# Patient Record
Sex: FEMALE | Race: WHITE | NOT HISPANIC OR LATINO | URBAN - METROPOLITAN AREA
[De-identification: names, ages, dates, MRNs, and addresses within clinical notes are randomized per-mention and may not be internally consistent; named-entity substitution may affect disease eponyms.]

---

## 2023-02-28 LAB
ERYTHROCYTE DISTRIBUTION WIDTH (RATIO) BY AUTOMATED COUNT: 13.5 % (ref 11.5–14.5)
ERYTHROCYTE MEAN CORPUSCULAR HEMOGLOBIN CONCENTRATION (G/DL) BY AUTOMATED: 32.2 G/DL (ref 32–36)
ERYTHROCYTE MEAN CORPUSCULAR VOLUME (FL) BY AUTOMATED COUNT: 83 FL (ref 80–100)
ERYTHROCYTES (10*6/UL) IN BLOOD BY AUTOMATED COUNT: 4.19 X10E12/L (ref 4–5.2)
GLUCOSE, 1 HR SCREEN, PREG: 120 MG/DL
HEMATOCRIT (%) IN BLOOD BY AUTOMATED COUNT: 34.8 % (ref 36–46)
HEMOGLOBIN (G/DL) IN BLOOD: 11.2 G/DL (ref 12–16)
LEUKOCYTES (10*3/UL) IN BLOOD BY AUTOMATED COUNT: 11.3 X10E9/L (ref 4.4–11.3)
NRBC (PER 100 WBCS) BY AUTOMATED COUNT: 0 /100 WBC (ref 0–0)
PLATELETS (10*3/UL) IN BLOOD AUTOMATED COUNT: 243 X10E9/L (ref 150–450)

## 2023-03-10 PROBLEM — B35.9 TINEA: Status: ACTIVE | Noted: 2023-03-10

## 2023-03-10 PROBLEM — S90.31XA CONTUSION OF RIGHT FOOT: Status: ACTIVE | Noted: 2023-03-10

## 2023-03-10 PROBLEM — S99.929A FOOT INJURY: Status: ACTIVE | Noted: 2023-03-10

## 2023-03-10 PROBLEM — R30.0 DYSURIA: Status: ACTIVE | Noted: 2023-03-10

## 2023-03-10 PROBLEM — M79.671 ACUTE FOOT PAIN, RIGHT: Status: ACTIVE | Noted: 2023-03-10

## 2023-03-10 PROBLEM — S93.609A FOOT SPRAIN: Status: ACTIVE | Noted: 2023-03-10

## 2023-03-10 PROBLEM — R35.0 URINARY FREQUENCY: Status: ACTIVE | Noted: 2023-03-10

## 2023-03-10 PROBLEM — R05.8 POST-VIRAL COUGH SYNDROME: Status: ACTIVE | Noted: 2023-03-10

## 2023-03-20 ENCOUNTER — DOCUMENTATION (OUTPATIENT)
Dept: PEDIATRICS | Facility: CLINIC | Age: 28
End: 2023-03-20

## 2023-03-20 RX ORDER — B-COMPLEX WITH VITAMIN C
1 TABLET ORAL DAILY
COMMUNITY
Start: 2023-02-28

## 2023-03-20 RX ORDER — NORETHINDRONE ACETATE AND ETHINYL ESTRADIOL 1MG-20(24)
1 KIT ORAL DAILY
COMMUNITY
Start: 2022-05-12 | End: 2024-05-02

## 2023-03-20 RX ORDER — KETOCONAZOLE 20 MG/ML
1 SHAMPOO, SUSPENSION TOPICAL 2 TIMES WEEKLY
COMMUNITY
Start: 2022-05-24 | End: 2024-04-12

## 2023-04-29 LAB — GROUP B STREP SCREEN: NORMAL

## 2023-06-16 LAB
CLUE CELLS: NORMAL
NUGENT SCORE: NORMAL
YEAST: NORMAL

## 2024-01-23 ENCOUNTER — TELEPHONE (OUTPATIENT)
Dept: OBSTETRICS AND GYNECOLOGY | Facility: CLINIC | Age: 29
End: 2024-01-23
Payer: COMMERCIAL

## 2024-01-23 DIAGNOSIS — R21 RASH: Primary | ICD-10-CM

## 2024-01-25 NOTE — TELEPHONE ENCOUNTER
Pt reports she is still having problems with PUPPS even though delivery was 8 moths ago.  She felt like things started to resolve after delivery but then she got raised red area on hips and b/l elbows and now one of the areas is still persistently raised, red and itchy.  According to PN records, itching started in stretch marks and then moved to elbow/hips.  Was treated as PUPPS but made patient aware PUPPS usually doesn't last this long and that may  not have been correct diagnosis although treatment would not have been different.   Recommend follow up with Dermatology. Referral placed.

## 2024-01-29 ENCOUNTER — DOCUMENTATION (OUTPATIENT)
Dept: PHYSICAL THERAPY | Facility: CLINIC | Age: 29
End: 2024-01-29
Payer: COMMERCIAL

## 2024-04-12 ENCOUNTER — OFFICE VISIT (OUTPATIENT)
Dept: OBSTETRICS AND GYNECOLOGY | Facility: CLINIC | Age: 29
End: 2024-04-12
Payer: COMMERCIAL

## 2024-04-12 VITALS
BODY MASS INDEX: 26.12 KG/M2 | DIASTOLIC BLOOD PRESSURE: 60 MMHG | SYSTOLIC BLOOD PRESSURE: 118 MMHG | HEIGHT: 64 IN | WEIGHT: 153 LBS

## 2024-04-12 DIAGNOSIS — Z12.4 CERVICAL CANCER SCREENING: ICD-10-CM

## 2024-04-12 DIAGNOSIS — Z01.419 WELL WOMAN EXAM: Primary | ICD-10-CM

## 2024-04-12 PROCEDURE — 99395 PREV VISIT EST AGE 18-39: CPT

## 2024-04-12 PROCEDURE — 88175 CYTOPATH C/V AUTO FLUID REDO: CPT

## 2024-04-12 PROCEDURE — 88141 CYTOPATH C/V INTERPRET: CPT | Performed by: PATHOLOGY

## 2024-04-12 ASSESSMENT — PAIN SCALES - GENERAL: PAINLEVEL: 0-NO PAIN

## 2024-04-12 NOTE — PROGRESS NOTES
Assessment/Plan   Diagnoses and all orders for this visit:  Well woman exam  Cervical cancer screening  -     THINPREP PAP TEST (24-30)    Discussed safe pregnancy spacing.   Encouraged to reach out to our office with any questions or concerns.   Encouraged patient to follow up annually or PRN    ABRAM Freeman-SAULO     Subjective   Ana Cruz is a 28 y.o. female who is here for a routine exam.     Concerns today:  Patient is interested in pregnancy. She had a baby in may 2023, is curious about safe pregnancy spacing.     Patient's last menstrual period was 2024 (approximate).   Periods are regular every 28-30 days, lasting 5 days.   Dysmenorrhea:mild, occurring first 1-2 days of flow.   Cyclic symptoms include none.     Sexual Activity: sexually active, male partners; Patient reports 1 partners in the last 12 months.  Pain with intercourse? No   Loss of desire? No   Able to have an orgasm? Yes     History of prior STI: none  Current contraception: OCP (estrogen/progesterone)  Last pap: patient reports history of pap, none found in chart. Repeat to be completed today   History of abnormal Pap smear: no  Family history of uterine or ovarian cancer: no  Last mammogram:na   History of abnormal mammogram: no  Family history of breast cancer: no    Past Medical History:   Diagnosis Date    Other conditions influencing health status     Allergy To Bugs      Past Surgical History:   Procedure Laterality Date    OTHER SURGICAL HISTORY  2020    No history of surgery        Review of Systems   All other systems reviewed and are negative.     Menstrual History:  OB History          1    Para   1    Term   1            AB        Living   1         SAB        IAB        Ectopic        Multiple        Live Births   1                Pregnancy history: Patient reports that her previous pregnancy was significant for PUPPS and an Episiotomy with a 3rd degree laceration.   Patient's last menstrual  "period was 03/29/2024 (approximate).     Objective   /60   Ht 1.626 m (5' 4\")   Wt 69.4 kg (153 lb)   LMP 03/29/2024 (Approximate)   BMI 26.26 kg/m²       General:   Alert and oriented x 3   Heart:  Thyroid: Regular rate, rhythm  Euthyroid, normal shape and size   Lungs:  Breast: Clear to auscultation bilaterally  Symmetrical, no skin changes/nipple discharge, redness, tenderness, no masses palpated bilaterally   Abdomen: Soft, non tender   Vulva: EGBUS normal   Vagina: Pink, normal discharge, pap completed today.    Cervix: No CMT   Uterus: Normal shape, size   Adnexa: NT bilaterally       "

## 2024-04-22 LAB
CYTOLOGY CMNT CVX/VAG CYTO-IMP: NORMAL
LAB AP HPV GENOTYPE QUESTION: NO
LAB AP HPV HR: NORMAL
LABORATORY COMMENT REPORT: NORMAL
LMP START DATE: NORMAL
PATH REPORT.TOTAL CANCER: NORMAL

## 2024-05-31 ENCOUNTER — APPOINTMENT (OUTPATIENT)
Dept: OBSTETRICS AND GYNECOLOGY | Facility: CLINIC | Age: 29
End: 2024-05-31
Payer: COMMERCIAL

## 2024-07-17 ENCOUNTER — INITIAL PRENATAL (OUTPATIENT)
Dept: OBSTETRICS AND GYNECOLOGY | Facility: CLINIC | Age: 29
End: 2024-07-17
Payer: COMMERCIAL

## 2024-07-17 ENCOUNTER — APPOINTMENT (OUTPATIENT)
Dept: OBSTETRICS AND GYNECOLOGY | Facility: CLINIC | Age: 29
End: 2024-07-17
Payer: COMMERCIAL

## 2024-07-17 VITALS — BODY MASS INDEX: 27.4 KG/M2 | SYSTOLIC BLOOD PRESSURE: 110 MMHG | WEIGHT: 159.6 LBS | DIASTOLIC BLOOD PRESSURE: 60 MMHG

## 2024-07-17 DIAGNOSIS — Z32.01 PREGNANCY TEST POSITIVE (HHS-HCC): ICD-10-CM

## 2024-07-17 DIAGNOSIS — Z34.81 ENCOUNTER FOR SUPERVISION OF NORMAL PREGNANCY IN MULTIGRAVIDA IN FIRST TRIMESTER (HHS-HCC): Primary | ICD-10-CM

## 2024-07-17 DIAGNOSIS — O09.299 HX OF MATERNAL LACERATION, 3RD DEGREE, CURRENTLY PREGNANT (HHS-HCC): ICD-10-CM

## 2024-07-17 LAB — PREGNANCY TEST URINE, POC: POSITIVE

## 2024-07-17 PROCEDURE — 0500F INITIAL PRENATAL CARE VISIT: CPT

## 2024-07-17 PROCEDURE — 87086 URINE CULTURE/COLONY COUNT: CPT

## 2024-07-17 PROCEDURE — 81025 URINE PREGNANCY TEST: CPT

## 2024-07-17 PROCEDURE — 87491 CHLMYD TRACH DNA AMP PROBE: CPT

## 2024-07-17 PROCEDURE — 87591 N.GONORRHOEAE DNA AMP PROB: CPT

## 2024-07-17 ASSESSMENT — ENCOUNTER SYMPTOMS
HEMATOLOGIC/LYMPHATIC NEGATIVE: 0
CARDIOVASCULAR NEGATIVE: 0
PSYCHIATRIC NEGATIVE: 0
ENDOCRINE NEGATIVE: 0
RESPIRATORY NEGATIVE: 0
ALLERGIC/IMMUNOLOGIC NEGATIVE: 0
NEUROLOGICAL NEGATIVE: 0
CONSTITUTIONAL NEGATIVE: 0
GASTROINTESTINAL NEGATIVE: 0
EYES NEGATIVE: 0
MUSCULOSKELETAL NEGATIVE: 0

## 2024-07-17 NOTE — PROGRESS NOTES
Assessment   Diagnoses and all orders for this visit:  Encounter for supervision of normal pregnancy [facility]  -     US MAC OB imaging order; Future  -     CBC Anemia Panel With Reflex,Pregnancy; Future  -     HEMOGLOBIN IDENTIFICATION WITH PATH REVIEW; Future  -     Hepatitis B surface antigen; Future  -     Hepatitis C antibody; Future  -     Rubella Antibody, IgG; Future  -     Syphilis Screen with Reflex; Future  -     HIV 1/2 Antigen/Antibody Screen with Reflex to Confirmation; Future  -     Varicella Zoster Antibody, IgG; Future  Hx of maternal laceration, 3rd degree, currently pregnant ([facility])  Pregnancy test positive ([facility])  -     POCT pregnancy, urine manually resulted  -     Urine culture  -     C. trachomatis + N. gonorrhoeae, Amplified; Future      Plan   - New OB resources provided and reviewed with particular attention to dietary, travel, and medication restrictions  - Oriented to practice, CNM vs. MD care  - Reviewed IOM recommendations for weight gain given pt's BMI: 15-25 pounds (BMI 25-29.9)  - Reviewed bleeding precautions, warning signs, when to call provider; phone number provided  - Routine NOB labs ordered  - Discussed Centering Pregnancy with patient, patient not interested at this time.   - Dating ultrasound ordered  - Return in 4 weeks for routine prenatal care    ARBAM Freeman-CNM    Subjective   Ana Cruz is a 29 y.o.  at 8 weeks with a working estimated date of delivery of 24. who presents for an initial prenatal visit. This pregnancy is planned.    Bleeding or cramping since LMP: no  Taking prenatal vitamin: Yes  Ultrasound completed this pregnancy: No    Last pap: 24  ROS neg    OB History    Para Term  AB Living   2 1 1     1   SAB IAB Ectopic Multiple Live Births           1      # Outcome Date GA Lbr Lito/2nd Weight Sex Type Anes PTL Lv   2 Current            1 Term      Vag-Spont   CHARISSE         Prior pregnancy complications:  third degree laceration.   History of hypertension:  No    Past Medical History:   Diagnosis Date    Other conditions influencing health status     Allergy To Bugs      Past Surgical History:   Procedure Laterality Date    OTHER SURGICAL HISTORY  2020    No history of surgery      Social History     Socioeconomic History    Marital status: Single   Tobacco Use    Smoking status: Never    Smokeless tobacco: Never   Vaping Use    Vaping status: Never Used   Substance and Sexual Activity    Alcohol use: Yes     Comment: social    Drug use: Never    Sexual activity: Yes     Partners: Male        Objective   Physical Exam  Weight: 72.4 kg (159 lb 9.6 oz)  TW.272 kg (9.6 oz)   Expected Total Weight Gain: 7 kg (15 lb)-11.5 kg (25 lb)   Pregravid BMI: 27.28  BP: 110/60         General:   Alert and oriented x 3   Heart:  Lungs: Regular rate, rhythm  Clear to auscultation bilaterally   Thyroid: Euthyroid, normal shape and size   Breast: Symmetrical, no skin changes/nipple discharge, redness, tenderness, no masses palpated bilaterally   Abdomen: Soft, non tender   Vulva: EGBUS normal   Vagina: Pink, normal discharge   Cervix: No CMT   Uterus: Gravid 8 weeks   Adnexa: NT bilaterally       Postpartum Depression: Not on file        Problem List       No episode was linked to this visit.               Important in pregnancy    Avoidance of alcohol, tobacco and drug use   Dietary restrictions reviewed including avoiding raw or poorly cooked meat, lunch meat and soft cheeses  3.    Adequate water intake.  Avoid empty calories with juices  4.    Recommendation for weight gain based on initial BMI (body mass index)  5.    Limit caffeine to less than 200-300 mg/day  6.    Take folic acid 400 mcg daily.  Incorporate 5,000u Vitamin D3 per day.  Discuss Magnesium Supplementation  7.    Importance of good sleep hygiene and avoidance of laying on back after 15 weeks  8.    Encourage daily physical  activity of 30 minutes a day the majority of the days of the week  9.    Discussed normal physiologic changes:  Round ligament pain, nausea, breast tenderness  10.  Discussed natural remedies, vitamins and prescription medications for nausea  11.  Baby aspirin 162mg daily (two baby aspirin) for the reduction of pre-eclampsia during pregnancy.  Even if you have          never had any blood pressure issues, you can develop hypertension during your pregnancy.  This has been well          Studied and safe to take starting at 12 weeks gestation until after the birth of your baby.     **IF AT ANYTIME DURING YOUR PREGNANCY YOU HAVE CONCERNS THAT YOU CANNOT AFFORD HEALTHY FOOD PLEASE LET US KNOW!**  We have a Food for Life program and would be happy to place a referral for you.  It is so important to eat healthy during your pregnancy and we treat food as medicine.  Healthy food is expensive!  This program will allow you and your family up to 4 to receive food and recipes for one week per month.  This needs to be renewed every 6 months.     Ultrasound and screening for aneuploidies (Down Syndrome/Trisomy 21, Trisomy 13 + 18)  A requisition has been placed for a dating ultrasound.  Please call to get that scheduled.    At this ultrasound they will ask you if your would like to proceed with screening for genetic disorders that are listed above.  They will do that with an ultrasound at approximately 13 weeks and we also draw blood work for screening which includes the fetal sex if you desire to know.     Ultrasound for anatomy will be done at 19 weeks.  Based on risk factors and any concerns the maternal fetal medicine provider has, you may need a repeat ultrasound.  Healthy pregnancies that do not have any other concerns by the midwife or maternal fetal medicine do not have any repeat ultrasounds done.     Labs:   An order will be placed for your new ob labs.  Please get those done at the time of your ultrasound.  They will  collect          multiple tubes of blood for new ob labs and also urine for STI testing and a urine culture.   If there are any concerns with any blood work or urine testing WE WILL CALL YOU OR COMMUNICATE VIA          [employer]!!!   The biggest concerns our patients have is when they see their complete blood count.  The reference          range in the result is for a non pregnant person!  We will notify you if there is any need to start an iron supplement.  3.    At 26-28 weeks a glucose test is ordered to see if you have gestational diabetes.  We also reassess if you have          Anemia, which can be common in pregnancy  4.    Group B strep culture will be done at 36 weeks gestation.     We also recommend that you be screened once in your life for Cystic Fibrosis and Spinal Muscular Atrophy.  This assesses if we need your partner to be tested if you are a carrier of a gene that can be passed along to your baby.  For this to happen, both parents must be a carrier to the gene.     Choices for care and hospital for birth:  I am a Certified Nurse Midwife and practice in a group setting, which means that any of the midwives in my group practice may be there for your birth.  We care for healthy females during pregnancy and labor/birth.  We practice in collaboration with physicians within our group.  If there are any concerns with your pregnancy, labor or birth our physicians work closely with us.       The midwives in our practice strongly encourage you to explore the option of Centering Pregnancy which has been studied for better birth outcomes!  Care will be done in a group setting with 1:1 time with a midwife and then in depth education about every stage of your pregnancy, labor/birth,  care, feeding choices, pediatrician options, birth control and coping techniques for the first few weeks after birth.  We have day groups and our [place] location and a Monday evening group at [facility].  The groups follow your  "normal prenatal schedule and yes, we keep in contact and I see you at the end of your pregnancy.     There are certain medical conditions that \"risks you out\" of midwifery care that we are constantly assessing for.  Some conditions during your pregnancy that would risk you out of midwifery care would be:   Severe growth restriction of your baby   Labor/Birth  less than 35 weeks   Severe pre-eclampsia at any time during your pregnancy/labor/birth   Gestational Diabetes needing medication (insulin) to control your blood sugars   If you decline or do not complete your glucose testing to rule out diet controlled diabetes by 32 weeks   If you are diagnosed with chronic hypertension during your pregnancy and need to start medication     The options for birth where we provide / Certified Nurse Midwifery coverage with a board certified OB/GYN, in house anesthesia and neonataology coverage are:     [facility] at [place], [place]     To call for questions regarding your care of if you are in labor is [phone]  My nurses name is Janel Dominguez who can answer questions and keep me updated should any questions or concerns arise during your pregnancy.     After hours, the answering service will ask you where you intended to give birth and connect you to the midwife on call at [facility] or the [facility] at [facility].     If you would like to tour either facility:  Please call the Childbirth education line at [phone]     Danger signs to report:  Seek medical care immediately if you have pain that is doubling you over or vaginal bleeding that is heavier than a  period  Notify the office should you have any burning, urgency, frequency of urination or other concerning symptoms.     Medications that are safe for common discomforts of pregnancy:   Tylenol   Tums or Papaya extract for any upset stomach or " "heartburn   Zyrtec, Claritin, Benadryl for allergy symptoms   Sudafed or Robitussin for cold symptoms  [employer] is an axel that is great for what medications are safe to take throughout your pregnancy and breastfeeding journey through the first year of life!  Well worth the $3.99     Work restrictions:  A normal healthy pregnancy without any complications are able to have the standard pregnancy work restrictions which is no pushing/pulling/lifting greater than 25 pounds independently     FMLA paperwork  Can be brought to the office for us to fill out for when you are starting your maternity leave (either your scheduled date of going to the hospital or your due date).  We cannot give out early FMLA when there is no documented medical conditions that are considered \"normal pregnancy\" events.     Comfort measures   Chiropractors are great for alleviation of ligament pain   Yoga is good for your ligaments and mentally preparing for baby and labor.  A prenatal yoga class is recommended.  3.     Prenatal massages are fine  4.     A maternity support belt is an amazing thing that can help ligament pain -- can be purchased on Amazon  5.     Good supportive shoes are key to helping with ligament pain     Dental care  It is very important to see a dentist during your pregnancy for routine cleaning and also if you develop any dental pain during your pregnancy.  Healthy gums and teeth are very important during your pregnancy.  We can provide you with a dental letter if your dentist would like one.     Thank you for choosing our practice and [facility] for you healthcare!  I am excited to partner with you during this very special time!      If there is anything I can do to help make your experience is positive, please come to your visits with questions and concerns and do not be afraid to ask what is on your mind!  We will see you in the office every 4 weeks until you are 30 weeks, then every 2 weeks until 36 weeks and " then weekly until your baby is born.

## 2024-07-18 LAB
BACTERIA UR CULT: NORMAL
C TRACH RRNA SPEC QL NAA+PROBE: NEGATIVE
N GONORRHOEA DNA SPEC QL PROBE+SIG AMP: NEGATIVE

## 2024-07-19 ENCOUNTER — APPOINTMENT (OUTPATIENT)
Dept: OBSTETRICS AND GYNECOLOGY | Facility: CLINIC | Age: 29
End: 2024-07-19
Payer: COMMERCIAL

## 2024-07-31 ENCOUNTER — TELEPHONE (OUTPATIENT)
Dept: OBSTETRICS AND GYNECOLOGY | Facility: CLINIC | Age: 29
End: 2024-07-31
Payer: COMMERCIAL

## 2024-07-31 DIAGNOSIS — Z3A.10 10 WEEKS GESTATION OF PREGNANCY (HHS-HCC): ICD-10-CM

## 2024-07-31 NOTE — TELEPHONE ENCOUNTER
Pt verified by name and .  Pt calling regarding her blood work and genetic testing.  Pt advised to get blood work done.  Pt is aware she can  [employer] genetic box to take to lab.  Nurse will have at .  Pt will have  pick it up.  Pt has no questions at this time.

## 2024-08-01 ENCOUNTER — LAB (OUTPATIENT)
Dept: LAB | Facility: LAB | Age: 29
End: 2024-08-01
Payer: COMMERCIAL

## 2024-08-01 DIAGNOSIS — Z34.81 ENCOUNTER FOR SUPERVISION OF NORMAL PREGNANCY IN MULTIGRAVIDA IN FIRST TRIMESTER (HHS-HCC): ICD-10-CM

## 2024-08-01 LAB
ERYTHROCYTE [DISTWIDTH] IN BLOOD BY AUTOMATED COUNT: 13.8 % (ref 11.5–14.5)
HBV SURFACE AG SERPL QL IA: NONREACTIVE
HCT VFR BLD AUTO: 41.2 % (ref 36–46)
HCV AB SER QL: NONREACTIVE
HGB BLD-MCNC: 13.1 G/DL (ref 12–16)
HIV 1+2 AB+HIV1 P24 AG SERPL QL IA: NONREACTIVE
MCH RBC QN AUTO: 25.7 PG (ref 26–34)
MCHC RBC AUTO-ENTMCNC: 31.8 G/DL (ref 32–36)
MCV RBC AUTO: 81 FL (ref 80–100)
NRBC BLD-RTO: 0 /100 WBCS (ref 0–0)
PLATELET # BLD AUTO: 288 X10*3/UL (ref 150–450)
RBC # BLD AUTO: 5.1 X10*6/UL (ref 4–5.2)
REFLEX ADDED, ANEMIA PANEL: NORMAL
RUBV IGG SERPL IA-ACNC: 2.4 IA
RUBV IGG SERPL QL IA: POSITIVE
TREPONEMA PALLIDUM IGG+IGM AB [PRESENCE] IN SERUM OR PLASMA BY IMMUNOASSAY: NONREACTIVE
VARICELLA ZOSTER IGG INDEX: 1.2 IA
VZV IGG SER QL IA: POSITIVE
WBC # BLD AUTO: 9.4 X10*3/UL (ref 4.4–11.3)

## 2024-08-01 PROCEDURE — 86803 HEPATITIS C AB TEST: CPT

## 2024-08-01 PROCEDURE — 86317 IMMUNOASSAY INFECTIOUS AGENT: CPT

## 2024-08-01 PROCEDURE — 87389 HIV-1 AG W/HIV-1&-2 AB AG IA: CPT

## 2024-08-01 PROCEDURE — 85027 COMPLETE CBC AUTOMATED: CPT

## 2024-08-01 PROCEDURE — 87340 HEPATITIS B SURFACE AG IA: CPT

## 2024-08-01 PROCEDURE — 86787 VARICELLA-ZOSTER ANTIBODY: CPT

## 2024-08-01 PROCEDURE — 36415 COLL VENOUS BLD VENIPUNCTURE: CPT

## 2024-08-01 PROCEDURE — 83021 HEMOGLOBIN CHROMOTOGRAPHY: CPT

## 2024-08-01 PROCEDURE — 86780 TREPONEMA PALLIDUM: CPT

## 2024-08-04 LAB
HEMOGLOBIN A2: 3.1 % (ref 2–3.5)
HEMOGLOBIN A: 96.5 % (ref 95.8–98)
HEMOGLOBIN F: 0.4 % (ref 0–2)
HEMOGLOBIN IDENTIFICATION INTERPRETATION: NORMAL
PATH REVIEW-HGB IDENTIFICATION: NORMAL

## 2024-08-13 LAB — SCAN RESULT: NORMAL

## 2024-08-15 NOTE — PROGRESS NOTES
Assessment/Plan   Diagnoses and all orders for this visit:  Encounter for supervision of normal pregnancy in multigravida in first trimester ([facility])  Hx of maternal laceration, 3rd degree, currently pregnant ([facility])      Lab results reviewed.  Patient has US scheduled for NTT on 24  Reviewed NIPT results WNL  Reviewed warning signs and when to call provider  Follow up in 4 weeks for a routine prenatal visit.    REINALDO Freeman    Luis Cruz is a 29 y.o.  at 12w2d with a working estimated date of delivery of 2025, by Last Menstrual Period who presents for a routine prenatal visit. She denies vaginal bleeding and cramping.   Pt endorses no questions or concerns.     Her pregnancy is complicated by:  Pregnancy Problems (from 24 to present)       Problem Noted Resolved    12 weeks gestation of pregnancy ([facility]) 2024 by REINALDO Freeman No    Priority:  Medium      Overview Signed 2024  4:27 PM by REINALDO Freeman     Desired provider in labor: [x] CNM  [] Physician  [x] Blood Products: [x] Yes, accepts [] No, needs counseling  [x] Initial BMI: 27.28   [x] Prenatal Labs: WNL  [x] Cervical Cancer Screening up to date  [x] Rh status:  O POS  [x] Genetic Screening:  WNL, BOY  [] NT US: (11-13 wks)  [] Baby ASA (if indicated):  [] Pregnancy dated by:     [] Anatomy US: (19-20 wks)  [] Federal Sterilization consent signed (if indicated):  [] 1hr GCT at 24-28wks:  [] Rhogam (if indicated):   [] Fetal Surveillance (if indicated):  [] Tdap (27-32 wks, may be given up to 36 wks if initial window missed):   [] RSV (32-36 wks) (Sept. to end of ):   [] Flu Vaccine:    [] Breastfeeding:  [] Postpartum Birth control method:   [] GBS at 36 - 37 wks:  [] 39 weeks discussion of IOL vs. Expectant management:  [] Mode of delivery ( anticipated ):           Hx of maternal laceration, 3rd degree, currently pregnant ([facility]) 2024 by Patricia Stewart,  REINALDO No    Priority:  Medium      Overview Signed 7/17/2024  6:24 PM by REINALDO Freeman     Patient is interested in potential induction at 39 wks.                   Objective   Physical Exam  Weight: 73.9 kg (163 lb), Pregravid BMI: 27.28  Expected Total Weight Gain: 7 kg (15 lb)-11.5 kg (25 lb)   BP: 122/72  Fetal Heart Rate: 150

## 2024-08-16 ENCOUNTER — APPOINTMENT (OUTPATIENT)
Dept: OBSTETRICS AND GYNECOLOGY | Facility: CLINIC | Age: 29
End: 2024-08-16
Payer: COMMERCIAL

## 2024-08-16 VITALS — WEIGHT: 163 LBS | BODY MASS INDEX: 27.98 KG/M2 | SYSTOLIC BLOOD PRESSURE: 122 MMHG | DIASTOLIC BLOOD PRESSURE: 72 MMHG

## 2024-08-16 DIAGNOSIS — Z34.81 ENCOUNTER FOR SUPERVISION OF NORMAL PREGNANCY IN MULTIGRAVIDA IN FIRST TRIMESTER (HHS-HCC): Primary | ICD-10-CM

## 2024-08-16 DIAGNOSIS — O09.299 HX OF MATERNAL LACERATION, 3RD DEGREE, CURRENTLY PREGNANT (HHS-HCC): ICD-10-CM

## 2024-08-16 DIAGNOSIS — Z3A.12 12 WEEKS GESTATION OF PREGNANCY (HHS-HCC): ICD-10-CM

## 2024-08-16 PROCEDURE — 0501F PRENATAL FLOW SHEET: CPT

## 2024-08-20 ENCOUNTER — APPOINTMENT (OUTPATIENT)
Dept: DERMATOLOGY | Facility: CLINIC | Age: 29
End: 2024-08-20
Payer: COMMERCIAL

## 2024-08-26 ENCOUNTER — TELEPHONE (OUTPATIENT)
Dept: OBSTETRICS AND GYNECOLOGY | Facility: CLINIC | Age: 29
End: 2024-08-26
Payer: COMMERCIAL

## 2024-08-26 NOTE — TELEPHONE ENCOUNTER
Contacted pt and verified name and .  Pt is GA 13w6d  Pt states she started feeling dizzy and faint with a mild headache, pt states she ate some food and has been drinking plenty of water. Pt states she has some improvement after she ate a snack earlier. But still lingering headache.  Pt denies spotting, nausea, vomiting, abdominal pain or pelvic pain.    Discussed symptoms with Ilene Sanders who suggest pt increase her fluid intake and eat smaller meals and snacks throughout the day, pt also advised if the dizzy/faint feeling gets worse to go be seen at main campus ER.  Patient states understanding and has no questions at this time.

## 2024-08-26 NOTE — TELEPHONE ENCOUNTER
Attempted to call pt for discuss MyChart message.  Pt did not answer, left VM to return call to clinic.

## 2024-08-28 ENCOUNTER — HOSPITAL ENCOUNTER (OUTPATIENT)
Dept: RADIOLOGY | Facility: CLINIC | Age: 29
Discharge: HOME | End: 2024-08-28
Payer: COMMERCIAL

## 2024-08-28 DIAGNOSIS — Z34.81 ENCOUNTER FOR SUPERVISION OF NORMAL PREGNANCY IN MULTIGRAVIDA IN FIRST TRIMESTER (HHS-HCC): ICD-10-CM

## 2024-08-28 DIAGNOSIS — O26.891 OTHER SPECIFIED PREGNANCY RELATED CONDITIONS, FIRST TRIMESTER (HHS-HCC): ICD-10-CM

## 2024-08-28 PROCEDURE — 76813 OB US NUCHAL MEAS 1 GEST: CPT | Performed by: STUDENT IN AN ORGANIZED HEALTH CARE EDUCATION/TRAINING PROGRAM

## 2024-08-28 PROCEDURE — 76813 OB US NUCHAL MEAS 1 GEST: CPT

## 2024-09-08 DIAGNOSIS — M62.838 NECK MUSCLE SPASM: Primary | ICD-10-CM

## 2024-09-08 RX ORDER — CYCLOBENZAPRINE HCL 10 MG
5 TABLET ORAL 3 TIMES DAILY
Qty: 15 TABLET | Refills: 0 | Status: SHIPPED | OUTPATIENT
Start: 2024-09-08 | End: 2024-09-18

## 2024-09-11 ENCOUNTER — APPOINTMENT (OUTPATIENT)
Dept: OBSTETRICS AND GYNECOLOGY | Facility: CLINIC | Age: 29
End: 2024-09-11
Payer: COMMERCIAL

## 2024-09-11 VITALS — DIASTOLIC BLOOD PRESSURE: 56 MMHG | BODY MASS INDEX: 28.15 KG/M2 | WEIGHT: 164 LBS | SYSTOLIC BLOOD PRESSURE: 112 MMHG

## 2024-09-11 DIAGNOSIS — Z3A.16 16 WEEKS GESTATION OF PREGNANCY (HHS-HCC): ICD-10-CM

## 2024-09-11 DIAGNOSIS — O09.299 HX OF MATERNAL LACERATION, 3RD DEGREE, CURRENTLY PREGNANT (HHS-HCC): ICD-10-CM

## 2024-09-11 DIAGNOSIS — Z34.81 ENCOUNTER FOR SUPERVISION OF NORMAL PREGNANCY IN MULTIGRAVIDA IN FIRST TRIMESTER (HHS-HCC): Primary | ICD-10-CM

## 2024-09-11 PROCEDURE — 0501F PRENATAL FLOW SHEET: CPT

## 2024-09-11 ASSESSMENT — ENCOUNTER SYMPTOMS
EYES NEGATIVE: 0
ALLERGIC/IMMUNOLOGIC NEGATIVE: 0
CARDIOVASCULAR NEGATIVE: 0
CONSTITUTIONAL NEGATIVE: 0
RESPIRATORY NEGATIVE: 0
ENDOCRINE NEGATIVE: 0
PSYCHIATRIC NEGATIVE: 0
MUSCULOSKELETAL NEGATIVE: 0
HEMATOLOGIC/LYMPHATIC NEGATIVE: 0
NEUROLOGICAL NEGATIVE: 0
GASTROINTESTINAL NEGATIVE: 0

## 2024-09-11 NOTE — PROGRESS NOTES
Assessment/Plan   Diagnoses and all orders for this visit:  Encounter for supervision of normal pregnancy in multigravida in first trimester ([facility])  Hx of maternal laceration, 3rd degree, currently pregnant ([facility])  16 weeks gestation of pregnancy ([facility])    Labs reviewed.  Anatomy US scheduled- 10/4/24  Patient is going on an 8 day cruise, discussed travel safety   Reviewed s/sx of PTL, warning signs, fetal movement counts, and when to call provider  Follow up in 4 weeks for a routine prenatal visit.    REINALDO Freeman    Subjective     Ana Cruz is a 29 y.o.  at 16w1d with a working estimated date of delivery of 2025, by Last Menstrual Period who presents for a routine prenatal visit. She denies vaginal bleeding, leakage of fluid, decreased fetal movements, or contractions.    Her pregnancy is complicated by:  Pregnancy Problems (from 24 to present)       Problem Noted Resolved    12 weeks gestation of pregnancy ([facility]) 2024 by REINALDO Freeman No    Priority:  Medium      Overview Signed 2024  4:27 PM by REINALDO Freeman     Desired provider in labor: [x] CNM  [] Physician  [x] Blood Products: [x] Yes, accepts [] No, needs counseling  [x] Initial BMI: 27.28   [x] Prenatal Labs: WNL  [x] Cervical Cancer Screening up to date  [x] Rh status:  O POS  [x] Genetic Screening:  WNL, BOY  [x] NT US: (11-13 wks)  [x] Baby ASA (if indicated): NI  [x] Pregnancy dated by: LMP    [] Anatomy US: (19-20 wks)  [] Federal Sterilization consent signed (if indicated):  [] 1hr GCT at 24-28wks:  [] Rhogam (if indicated):   [] Fetal Surveillance (if indicated):  [] Tdap (27-32 wks, may be given up to 36 wks if initial window missed):   [] RSV (32-36 wks) (Sept. to end of ):   [] Flu Vaccine:    [] Breastfeeding:  [] Postpartum Birth control method:   [] GBS at 36 - 37 wks:  [] 39 weeks discussion of IOL vs. Expectant management:  [] Mode of delivery ( anticipated  ):           Hx of maternal laceration, 3rd degree, currently pregnant ([facility]) 7/17/2024 by REINALDO Freeman No    Priority:  Medium      Overview Signed 7/17/2024  6:24 PM by REINALDO Freeman     Patient is interested in potential induction at 39 wks.                   Objective   Physical Exam  Weight: 74.4 kg (164 lb)  Expected Total Weight Gain: 7 kg (15 lb)-11.5 kg (25 lb)   Pregravid BMI: 27.28  BP: 112/56  Fetal Heart Rate: 155 Fundal Height (cm):  (4 below um)

## 2024-10-04 ENCOUNTER — HOSPITAL ENCOUNTER (OUTPATIENT)
Dept: RADIOLOGY | Facility: CLINIC | Age: 29
Discharge: HOME | End: 2024-10-04
Payer: COMMERCIAL

## 2024-10-04 DIAGNOSIS — Z34.81 ENCOUNTER FOR SUPERVISION OF NORMAL PREGNANCY IN MULTIGRAVIDA IN FIRST TRIMESTER: ICD-10-CM

## 2024-10-04 PROCEDURE — 76811 OB US DETAILED SNGL FETUS: CPT

## 2024-10-05 ENCOUNTER — TELEPHONE (OUTPATIENT)
Dept: OBSTETRICS AND GYNECOLOGY | Facility: HOSPITAL | Age: 29
End: 2024-10-05
Payer: COMMERCIAL

## 2024-10-05 NOTE — TELEPHONE ENCOUNTER
"Pt called emergency answering service line. Returned call, verified name and .     Pt states she noticed blood in her underwear 1.5 hours ago when she used the restroom. She describes it as dark in color, between the size of a dime and a quarter. Denies the passage of any blood clots. She did not have any blood on toilet tissue with wiping. She changed her underwear and has not had any subsequent bleeding since this incident. She denies associated symptoms of abdominal pain or cramping, vaginal irritation, or abnormal discharge. She has not felt FM much thus far this pregnancy.     She denies abdominal trauma or intercourse in past 48 hours.     Chart reviewed - pt had anatomy ultrasound performed yesterday, however results not yet published in EMR. Pt states she was told everything was normal except \"borderline-high fluid in the babies kidneys\". They plan to recheck at 34 weeks gestation.     Advised patient she can continue to monitor at home for now, however encouraged her to proceed to OB triage for evaluation if she has any subsequent bleeding or cramping tonight. She verbalizes understanding.     ABRAM Galvan-SAULO    "

## 2024-10-07 ENCOUNTER — APPOINTMENT (OUTPATIENT)
Dept: OBSTETRICS AND GYNECOLOGY | Facility: CLINIC | Age: 29
End: 2024-10-07
Payer: COMMERCIAL

## 2024-10-07 VITALS — WEIGHT: 169 LBS | SYSTOLIC BLOOD PRESSURE: 120 MMHG | DIASTOLIC BLOOD PRESSURE: 76 MMHG | BODY MASS INDEX: 29.01 KG/M2

## 2024-10-07 DIAGNOSIS — Z3A.19 19 WEEKS GESTATION OF PREGNANCY (HHS-HCC): ICD-10-CM

## 2024-10-07 DIAGNOSIS — Z34.82 ENCOUNTER FOR SUPERVISION OF NORMAL PREGNANCY IN MULTIGRAVIDA IN SECOND TRIMESTER: Primary | ICD-10-CM

## 2024-10-07 PROCEDURE — 0501F PRENATAL FLOW SHEET: CPT

## 2024-10-07 ASSESSMENT — ENCOUNTER SYMPTOMS
NEUROLOGICAL NEGATIVE: 0
MUSCULOSKELETAL NEGATIVE: 0
CARDIOVASCULAR NEGATIVE: 0
ENDOCRINE NEGATIVE: 0
EYES NEGATIVE: 0
HEMATOLOGIC/LYMPHATIC NEGATIVE: 0
GASTROINTESTINAL NEGATIVE: 0
ALLERGIC/IMMUNOLOGIC NEGATIVE: 0
CONSTITUTIONAL NEGATIVE: 0
RESPIRATORY NEGATIVE: 0
PSYCHIATRIC NEGATIVE: 0

## 2024-10-07 NOTE — PROGRESS NOTES
Assessment/Plan   Diagnoses and all orders for this visit:  Encounter for supervision of normal pregnancy in multigravida in second trimester  19 weeks gestation of pregnancy ([facility])      Labs reviewed.  Anatomy US reviewed WNL  Reviewed s/sx of PTL, warning signs, fetal movement counts, and when to call provider  Follow up in 4 weeks for a routine prenatal visit.    REINALDO Freeman    Subjective     Ana Cruz is a 29 y.o.  at 20w2d with a working estimated date of delivery of 2025, by Last Menstrual Period who presents for a routine prenatal visit. She denies vaginal bleeding, leakage of fluid, decreased fetal movements, or contractions.    Her pregnancy is complicated by:  Pregnancy Problems (from 24 to present)       Problem Noted Resolved    19 weeks gestation of pregnancy ([facility]) 2024 by REINALDO Freeman No    Priority:  Medium      Overview Addendum 10/10/2024  3:01 PM by REINALDO Freeman     Desired provider in labor: [x] CNM  [] Physician  [x] Blood Products: [x] Yes, accepts [] No, needs counseling  [x] Initial BMI: 27.28   [x] Prenatal Labs: WNL  [x] Cervical Cancer Screening up to date  [x] Rh status:  O POS  [x] Genetic Screening:  WNL, BOY  [x] NT US: (11-13 wks)  [x] Baby ASA (if indicated): NI  [x] Pregnancy dated by: LMP    [x] Anatomy US: (19-20 wks)  [] Federal Sterilization consent signed (if indicated):  [] 1hr GCT at 24-28wks:  [] Rhogam (if indicated):   [] Fetal Surveillance (if indicated):  [] Tdap (27-32 wks, may be given up to 36 wks if initial window missed):   [] RSV (32-36 wks) (Sept. to end of ):   [] Flu Vaccine:    [] Breastfeeding:  [] Postpartum Birth control method:   [] GBS at 36 - 37 wks:  [] 39 weeks discussion of IOL vs. Expectant management:  [] Mode of delivery ( anticipated ):           Hx of maternal laceration, 3rd degree, currently pregnant ([facility]) 2024 by REINALDO Freeman No    Priority:  Medium       Overview Signed 7/17/2024  6:24 PM by REINALDO Freeman     Patient is interested in potential induction at 39 wks.                   Objective   Physical Exam  Weight: 76.7 kg (169 lb)  Expected Total Weight Gain: 7 kg (15 lb)-11.5 kg (25 lb)   Pregravid BMI: 27.28  BP: 120/76  Fetal Heart Rate: 140 Fundal Height (cm): 20 cm

## 2024-10-11 ENCOUNTER — TELEPHONE (OUTPATIENT)
Dept: OBSTETRICS AND GYNECOLOGY | Facility: CLINIC | Age: 29
End: 2024-10-11
Payer: COMMERCIAL

## 2024-11-06 ENCOUNTER — APPOINTMENT (OUTPATIENT)
Dept: OBSTETRICS AND GYNECOLOGY | Facility: CLINIC | Age: 29
End: 2024-11-06
Payer: COMMERCIAL

## 2024-11-06 VITALS — SYSTOLIC BLOOD PRESSURE: 102 MMHG | BODY MASS INDEX: 29.52 KG/M2 | WEIGHT: 172 LBS | DIASTOLIC BLOOD PRESSURE: 56 MMHG

## 2024-11-06 DIAGNOSIS — Z3A.24 24 WEEKS GESTATION OF PREGNANCY (HHS-HCC): ICD-10-CM

## 2024-11-06 DIAGNOSIS — Z34.82 ENCOUNTER FOR SUPERVISION OF NORMAL PREGNANCY IN MULTIGRAVIDA IN SECOND TRIMESTER: Primary | ICD-10-CM

## 2024-11-06 PROCEDURE — 0501F PRENATAL FLOW SHEET: CPT

## 2024-11-06 ASSESSMENT — ENCOUNTER SYMPTOMS
HEMATOLOGIC/LYMPHATIC NEGATIVE: 0
EYES NEGATIVE: 0
RESPIRATORY NEGATIVE: 0
MUSCULOSKELETAL NEGATIVE: 0
CARDIOVASCULAR NEGATIVE: 0
ALLERGIC/IMMUNOLOGIC NEGATIVE: 0
NEUROLOGICAL NEGATIVE: 0
PSYCHIATRIC NEGATIVE: 0
CONSTITUTIONAL NEGATIVE: 0
GASTROINTESTINAL NEGATIVE: 0
ENDOCRINE NEGATIVE: 0

## 2024-11-06 NOTE — PROGRESS NOTES
Assessment/Plan   Diagnoses and all orders for this visit:  Encounter for supervision of normal pregnancy in multigravida in second trimester  24 weeks gestation of pregnancy ([facility])  -     Glucose, 1 Hour Screen, Pregnancy; Future  -     Syphilis Screen with Reflex; Future  -     CBC Anemia Panel With Reflex, Pregnancy; Future      Labs reviewed.  Discussed diabetes screening and routine labs, to be completed next visit  Reviewed s/sx of PTL, warning signs, fetal movement counts, and when to call provider  Follow up in 4 weeks for a routine prenatal visit.    REINALDO Freeman    Subjective     Ana Cruz is a 29 y.o.  at 24w1d with a working estimated date of delivery of 2025, by Last Menstrual Period who presents for a routine prenatal visit. She denies vaginal bleeding, leakage of fluid, decreased fetal movements, or contractions.    Her pregnancy is complicated by:  Pregnancy Problems (from 24 to present)       Problem Noted Diagnosed Resolved    24 weeks gestation of pregnancy ([facility]) 2024 by REINALDO Freeman  No    Priority:  Medium       Overview Addendum 10/10/2024  3:01 PM by REINALDO Freeman     Desired provider in labor: [x] CNM  [] Physician  [x] Blood Products: [x] Yes, accepts [] No, needs counseling  [x] Initial BMI: 27.28   [x] Prenatal Labs: WNL  [x] Cervical Cancer Screening up to date  [x] Rh status:  O POS  [x] Genetic Screening:  WNL, BOY  [x] NT US: (11-13 wks)  [x] Baby ASA (if indicated): NI  [x] Pregnancy dated by: LMP    [x] Anatomy US: (19-20 wks)  [] Federal Sterilization consent signed (if indicated):  [] 1hr GCT at 24-28wks:  [] Rhogam (if indicated):   [] Fetal Surveillance (if indicated):  [] Tdap (27-32 wks, may be given up to 36 wks if initial window missed):   [] RSV (32-36 wks) (Sept. to end of ):   [] Flu Vaccine:    [] Breastfeeding:  [] Postpartum Birth control method:   [] GBS at 36 - 37 wks:  [] 39 weeks discussion of  IOL vs. Expectant management:  [] Mode of delivery ( anticipated ):           Hx of maternal laceration, 3rd degree, currently pregnant ([facility]) 7/17/2024 by REINALDO Freeman  No    Priority:  Medium       Overview Signed 7/17/2024  6:24 PM by REINALDO Freeman     Patient is interested in potential induction at 39 wks.                   Objective   Physical Exam  Weight: 78 kg (172 lb)  Expected Total Weight Gain: 7 kg (15 lb)-11.5 kg (25 lb)   Pregravid BMI: 27.28  BP: 102/56  Fetal Heart Rate: 155 Fundal Height (cm): 24 cm

## 2024-11-15 ENCOUNTER — LAB (OUTPATIENT)
Dept: LAB | Facility: LAB | Age: 29
End: 2024-11-15
Payer: COMMERCIAL

## 2024-11-15 DIAGNOSIS — Z3A.24 24 WEEKS GESTATION OF PREGNANCY (HHS-HCC): ICD-10-CM

## 2024-11-15 PROCEDURE — 36415 COLL VENOUS BLD VENIPUNCTURE: CPT

## 2024-11-15 PROCEDURE — 86780 TREPONEMA PALLIDUM: CPT

## 2024-11-15 PROCEDURE — 82947 ASSAY GLUCOSE BLOOD QUANT: CPT

## 2024-11-15 PROCEDURE — 85027 COMPLETE CBC AUTOMATED: CPT

## 2024-11-16 LAB
ERYTHROCYTE [DISTWIDTH] IN BLOOD BY AUTOMATED COUNT: 13.9 % (ref 11.5–14.5)
GLUCOSE 1H P 50 G GLC PO SERPL-MCNC: 118 MG/DL
HCT VFR BLD AUTO: 35.3 % (ref 36–46)
HGB BLD-MCNC: 11.6 G/DL (ref 12–16)
MCH RBC QN AUTO: 26.2 PG (ref 26–34)
MCHC RBC AUTO-ENTMCNC: 32.9 G/DL (ref 32–36)
MCV RBC AUTO: 80 FL (ref 80–100)
NRBC BLD-RTO: 0 /100 WBCS (ref 0–0)
PLATELET # BLD AUTO: 263 X10*3/UL (ref 150–450)
RBC # BLD AUTO: 4.43 X10*6/UL (ref 4–5.2)
REFLEX ADDED, ANEMIA PANEL: NORMAL
TREPONEMA PALLIDUM IGG+IGM AB [PRESENCE] IN SERUM OR PLASMA BY IMMUNOASSAY: NONREACTIVE
WBC # BLD AUTO: 10.5 X10*3/UL (ref 4.4–11.3)

## 2024-11-25 ENCOUNTER — TELEPHONE (OUTPATIENT)
Dept: OBSTETRICS AND GYNECOLOGY | Facility: CLINIC | Age: 29
End: 2024-11-25
Payer: COMMERCIAL

## 2024-11-25 NOTE — TELEPHONE ENCOUNTER
Patient calling in [facility] paper work  States she has bene messaging in about paperwork that needs to be updated and sent in, due date was moved up to today  Discussed that office is closed on weekends and that staff is catching up on messages, we will send over paperwork sometime today when we are able  Patient verbalized understanding, all questions/concerns addressed at this time.    Amy Bray, ARUNN RN

## 2024-12-02 ENCOUNTER — APPOINTMENT (OUTPATIENT)
Dept: OBSTETRICS AND GYNECOLOGY | Facility: CLINIC | Age: 29
End: 2024-12-02
Payer: COMMERCIAL

## 2024-12-02 VITALS — BODY MASS INDEX: 30.28 KG/M2 | SYSTOLIC BLOOD PRESSURE: 98 MMHG | WEIGHT: 176.4 LBS | DIASTOLIC BLOOD PRESSURE: 62 MMHG

## 2024-12-02 DIAGNOSIS — O09.299 HX OF MATERNAL LACERATION, 3RD DEGREE, CURRENTLY PREGNANT (HHS-HCC): ICD-10-CM

## 2024-12-02 DIAGNOSIS — Z34.82 ENCOUNTER FOR SUPERVISION OF NORMAL PREGNANCY IN MULTIGRAVIDA IN SECOND TRIMESTER: Primary | ICD-10-CM

## 2024-12-02 DIAGNOSIS — Z3A.27 27 WEEKS GESTATION OF PREGNANCY (HHS-HCC): ICD-10-CM

## 2024-12-02 PROCEDURE — 0501F PRENATAL FLOW SHEET: CPT

## 2024-12-02 NOTE — PROGRESS NOTES
Assessment/Plan   Diagnoses and all orders for this visit:  Encounter for supervision of normal pregnancy in multigravida in second trimester  24 weeks gestation of pregnancy ([facility])  Hx of maternal laceration, 3rd degree, currently pregnant ([facility])      Labs reviewed.  Reviewed lab results glucose WNL  Reviewed s/sx of PTL, warning signs, fetal movement counts, and when to call provider  Follow up in 2 weeks for a routine prenatal visit.    Patricia Stewart, ABRAM-SAULO    Subjective     Ana Cruz is a 29 y.o.  at 27w6d with a working estimated date of delivery of 2025, by Last Menstrual Period who presents for a routine prenatal visit. She denies vaginal bleeding, leakage of fluid, decreased fetal movements, or contractions.      Her pregnancy is complicated by:  Problem List Items Addressed This Visit          Ob-Gyn Problems    Hx of maternal laceration, 3rd degree, currently pregnant ([facility])    Overview     Patient is interested in potential induction at 39 wks.          27 weeks gestation of pregnancy ([facility])    Overview     Desired provider in labor: [x] CNM  [] Physician  [x] Blood Products: [x] Yes, accepts [] No, needs counseling  [x] Initial BMI: 27.28   [x] Prenatal Labs: WNL  [x] Cervical Cancer Screening up to date  [x] Rh status:  O POS  [x] Genetic Screening:  WNL, BOY  [x] NT US: (11-13 wks)  [x] Baby ASA (if indicated): NI  [x] Pregnancy dated by: LMP    [x] Anatomy US: (19-20 wks)  [] Federal Sterilization consent signed (if indicated):  [] 1hr GCT at 24-28wks:  [] Rhogam (if indicated):   [] Fetal Surveillance (if indicated):  [] Tdap (27-32 wks, may be given up to 36 wks if initial window missed):   [] RSV (32-36 wks) (Sept. to end of ):   [] Flu Vaccine:    [] Breastfeeding:  [] Postpartum Birth control method:   [] GBS at 36 - 37 wks:  [] 39 weeks discussion of IOL vs. Expectant management:  [] Mode of delivery ( anticipated ):            Other Visit Diagnoses        Encounter for supervision of normal pregnancy in multigravida in second trimester    -  Primary            Objective   Physical Exam  Weight: 80 kg (176 lb 6.4 oz)  Expected Total Weight Gain: 7 kg (15 lb)-11.5 kg (25 lb)   Pregravid BMI: 27.28  BP: 98/62  Fetal Heart Rate: 150 Fundal Height (cm): 27 cm

## 2024-12-18 ENCOUNTER — TELEPHONE (OUTPATIENT)
Dept: OBSTETRICS AND GYNECOLOGY | Facility: CLINIC | Age: 29
End: 2024-12-18
Payer: COMMERCIAL

## 2024-12-18 NOTE — TELEPHONE ENCOUNTER
Left message for pt to return call.  Pt is 30 weeks pregnant sent my chart message she has stomach bug with watery diarrhea.  Nurse sent pt my chart message to call office.

## 2024-12-19 ENCOUNTER — TELEPHONE (OUTPATIENT)
Dept: OBSTETRICS AND GYNECOLOGY | Facility: CLINIC | Age: 29
End: 2024-12-19
Payer: COMMERCIAL

## 2024-12-19 NOTE — TELEPHONE ENCOUNTER
P{t verified by name and .  Nurse calling pt back.  Pt left message se had a stomach bug with diarrhea.  She is 30 weeks pregnant.  Pt states she is feeling much better able to eat and drink.  Baby is moving.  Pt has no questions at this time.

## 2024-12-30 ENCOUNTER — APPOINTMENT (OUTPATIENT)
Dept: OBSTETRICS AND GYNECOLOGY | Facility: CLINIC | Age: 29
End: 2024-12-30
Payer: COMMERCIAL

## 2024-12-30 VITALS — DIASTOLIC BLOOD PRESSURE: 62 MMHG | WEIGHT: 178 LBS | SYSTOLIC BLOOD PRESSURE: 104 MMHG | BODY MASS INDEX: 30.55 KG/M2

## 2024-12-30 DIAGNOSIS — Z34.83 ENCOUNTER FOR SUPERVISION OF NORMAL PREGNANCY IN MULTIGRAVIDA IN THIRD TRIMESTER: Primary | ICD-10-CM

## 2024-12-30 DIAGNOSIS — O09.299 HX OF MATERNAL LACERATION, 3RD DEGREE, CURRENTLY PREGNANT (HHS-HCC): ICD-10-CM

## 2024-12-30 DIAGNOSIS — Z3A.31 31 WEEKS GESTATION OF PREGNANCY (HHS-HCC): ICD-10-CM

## 2024-12-30 PROCEDURE — 0501F PRENATAL FLOW SHEET: CPT

## 2024-12-30 ASSESSMENT — ENCOUNTER SYMPTOMS
ALLERGIC/IMMUNOLOGIC NEGATIVE: 0
CARDIOVASCULAR NEGATIVE: 0
HEMATOLOGIC/LYMPHATIC NEGATIVE: 0
MUSCULOSKELETAL NEGATIVE: 0
EYES NEGATIVE: 0
GASTROINTESTINAL NEGATIVE: 0
RESPIRATORY NEGATIVE: 0
CONSTITUTIONAL NEGATIVE: 0
PSYCHIATRIC NEGATIVE: 0
NEUROLOGICAL NEGATIVE: 0
ENDOCRINE NEGATIVE: 0

## 2024-12-30 NOTE — PROGRESS NOTES
Assessment/Plan   Diagnoses and all orders for this visit:  Encounter for supervision of normal pregnancy in multigravida in third trimester  Hx of maternal laceration, 3rd degree, currently pregnant ([facility])  31 weeks gestation of pregnancy ([facility])    Discussed patient concerns for repeat 3rd degree laceration, we reviewed perineal massage and offered physical therapy. Patient would like the attempt massage at home.   Reviewed s/sx of PTL, warning signs, fetal movement counts, and when to call provider  Follow up in 2 week for a routine prenatal visit.    Patricia Stewart, ABRAM-SAULO    Subjective     Ana Cruz is a 29 y.o.  at 31w6d with a working estimated date of delivery of 2025, by Last Menstrual Period who presents for a routine prenatal visit. She denies vaginal bleeding, leakage of fluid, decreased fetal movements, or contractions.  Patient reports some concern for repeat 3rd degree in labor.     Her pregnancy is complicated by:  Problem List Items Addressed This Visit          Ob-Gyn Problems    Hx of maternal laceration, 3rd degree, currently pregnant ([facility])    Overview     Patient is interested in potential induction at 39 wks.          31 weeks gestation of pregnancy ([facility])    Overview     Desired provider in labor: [x] CNM  [] Physician  [x] Blood Products: [x] Yes, accepts [] No, needs counseling  [x] Initial BMI: 27.28   [x] Prenatal Labs: WNL  [x] Cervical Cancer Screening up to date  [x] Rh status:  O POS  [x] Genetic Screening:  WNL, BOY  [x] NT US: (11-13 wks)  [x] Baby ASA (if indicated): NI  [x] Pregnancy dated by: LMP    [x] Anatomy US: (19-20 wks)  [x] Federal Sterilization consent signed (if indicated): NI  [x] 1hr GCT at 24-28wks: WNL  [x] Rhogam (if indicated): NI   [x] Fetal Surveillance (if indicated): NI  [] Tdap (27-32 wks, may be given up to 36 wks if initial window missed):   [] RSV (32-36 wks) (Sept. to end of ):   [] Flu Vaccine:    [] Breastfeeding:  []  Postpartum Birth control method:   [] GBS at 36 - 37 wks:  [] 39 weeks discussion of IOL vs. Expectant management:  [] Mode of delivery ( anticipated ):            Other Visit Diagnoses       Encounter for supervision of normal pregnancy in multigravida in third trimester    -  Primary            Objective   Physical Exam:   Weight: 80.7 kg (178 lb)  Expected Total Weight Gain: 7 kg (15 lb)-11.5 kg (25 lb)   Pregravid BMI: 27.28  BP: 104/62  Fetal Heart Rate: 160 Fundal Height (cm): 31 cm Presentation: Vertex

## 2025-01-13 ENCOUNTER — APPOINTMENT (OUTPATIENT)
Dept: OBSTETRICS AND GYNECOLOGY | Facility: CLINIC | Age: 30
End: 2025-01-13
Payer: COMMERCIAL

## 2025-01-13 VITALS — BODY MASS INDEX: 30.93 KG/M2 | WEIGHT: 180.2 LBS | SYSTOLIC BLOOD PRESSURE: 100 MMHG | DIASTOLIC BLOOD PRESSURE: 60 MMHG

## 2025-01-13 DIAGNOSIS — Z3A.34 34 WEEKS GESTATION OF PREGNANCY (HHS-HCC): ICD-10-CM

## 2025-01-13 DIAGNOSIS — Z23 ENCOUNTER FOR IMMUNIZATION: ICD-10-CM

## 2025-01-13 DIAGNOSIS — O09.299 HX OF MATERNAL LACERATION, 3RD DEGREE, CURRENTLY PREGNANT (HHS-HCC): ICD-10-CM

## 2025-01-13 DIAGNOSIS — Z23 NEED FOR TDAP VACCINATION: ICD-10-CM

## 2025-01-13 DIAGNOSIS — Z71.85 VACCINE COUNSELING: ICD-10-CM

## 2025-01-13 DIAGNOSIS — Z34.83 ENCOUNTER FOR SUPERVISION OF NORMAL PREGNANCY IN MULTIGRAVIDA IN THIRD TRIMESTER: Primary | ICD-10-CM

## 2025-01-13 PROCEDURE — 90471 IMMUNIZATION ADMIN: CPT

## 2025-01-13 PROCEDURE — 0501F PRENATAL FLOW SHEET: CPT

## 2025-01-13 PROCEDURE — 90715 TDAP VACCINE 7 YRS/> IM: CPT

## 2025-01-13 ASSESSMENT — ENCOUNTER SYMPTOMS
CARDIOVASCULAR NEGATIVE: 0
PSYCHIATRIC NEGATIVE: 0
NEUROLOGICAL NEGATIVE: 0
HEMATOLOGIC/LYMPHATIC NEGATIVE: 0
ALLERGIC/IMMUNOLOGIC NEGATIVE: 0
CONSTITUTIONAL NEGATIVE: 0
EYES NEGATIVE: 0
RESPIRATORY NEGATIVE: 0
GASTROINTESTINAL NEGATIVE: 0
ENDOCRINE NEGATIVE: 0
MUSCULOSKELETAL NEGATIVE: 0

## 2025-01-13 NOTE — PROGRESS NOTES
Assessment/Plan   Diagnoses and all orders for this visit:  Encounter for supervision of normal pregnancy in multigravida in third trimester  Encounter for immunization  -     Tdap vaccine, age 7 years and older  ([employer])  34 weeks gestation of pregnancy ([facility])  Hx of maternal laceration, 3rd degree, currently pregnant ([facility])  Need for Tdap vaccination  Vaccine counseling      Tdap given today  Discussed  massage and reviewed how to.   GBS at next visit   Reviewed s/sx of PTL, warning signs, fetal movement counts, and when to call provider  Follow up in 2 week for a routine prenatal visit.    Patricia Stewart, APRN-CNM    Subjective     Ana Cruz is a 29 y.o.  at 33w6d with a working estimated date of delivery of 2025, by Last Menstrual Period who presents for a routine prenatal visit. She denies vaginal bleeding, leakage of fluid, decreased fetal movements, or contractions.    Her pregnancy is complicated by:  Problem List Items Addressed This Visit          Ob-Gyn Problems    Hx of maternal laceration, 3rd degree, currently pregnant ([facility])    Overview     Patient is interested in potential induction at 39 wks.          34 weeks gestation of pregnancy ([facility])    Overview     Desired provider in labor: [x] CNM  [] Physician  [x] Blood Products: [x] Yes, accepts [] No, needs counseling  [x] Initial BMI: 27.28   [x] Prenatal Labs: WNL  [x] Cervical Cancer Screening up to date  [x] Rh status:  O POS  [x] Genetic Screening:  WNL, BOY  [x] NT US: (11-13 wks)  [x] Baby ASA (if indicated): NI  [x] Pregnancy dated by: LMP    [x] Anatomy US: (19-20 wks)  [x] Federal Sterilization consent signed (if indicated): NI  [x] 1hr GCT at 24-28wks: WNL  [x] Rhogam (if indicated): NI   [x] Fetal Surveillance (if indicated): NI  [x] Tdap (27-32 wks, may be given up to 36 wks if initial window missed):   [] RSV (32-36 wks) (Sept. to end of ):   [] Flu Vaccine:    [] Breastfeeding:  [] Postpartum  Birth control method:   [] GBS at 36 - 37 wks:  [] 39 weeks discussion of IOL vs. Expectant management:  [] Mode of delivery ( anticipated ):            Other Visit Diagnoses       Encounter for supervision of normal pregnancy in multigravida in third trimester    -  Primary    Encounter for immunization        Relevant Orders    Tdap vaccine, age 7 years and older  ([employer]) (Completed)    Need for Tdap vaccination        Vaccine counseling                Objective   Physical Exam:   Weight: 81.7 kg (180 lb 3.2 oz)  Expected Total Weight Gain: 7 kg (15 lb)-11.5 kg (25 lb)   Pregravid BMI: 27.28  BP: 100/60  Fetal Heart Rate: 150 Fundal Height (cm): 33 cm Presentation: Vertex

## 2025-01-14 ENCOUNTER — HOSPITAL ENCOUNTER (OUTPATIENT)
Dept: RADIOLOGY | Facility: CLINIC | Age: 30
Discharge: HOME | End: 2025-01-14
Payer: COMMERCIAL

## 2025-01-14 DIAGNOSIS — Z34.81 ENCOUNTER FOR SUPERVISION OF NORMAL PREGNANCY IN MULTIGRAVIDA IN FIRST TRIMESTER: ICD-10-CM

## 2025-01-14 PROCEDURE — 76816 OB US FOLLOW-UP PER FETUS: CPT | Performed by: STUDENT IN AN ORGANIZED HEALTH CARE EDUCATION/TRAINING PROGRAM

## 2025-01-14 PROCEDURE — 76816 OB US FOLLOW-UP PER FETUS: CPT

## 2025-01-15 DIAGNOSIS — Z3A.31 31 WEEKS GESTATION OF PREGNANCY (HHS-HCC): ICD-10-CM

## 2025-01-15 PROBLEM — Z3A.34 34 WEEKS GESTATION OF PREGNANCY (HHS-HCC): Status: ACTIVE | Noted: 2024-08-16

## 2025-01-16 ENCOUNTER — EVALUATION (OUTPATIENT)
Dept: PHYSICAL THERAPY | Facility: CLINIC | Age: 30
End: 2025-01-16
Payer: COMMERCIAL

## 2025-01-16 DIAGNOSIS — R10.2 PELVIC PAIN IN FEMALE: ICD-10-CM

## 2025-01-16 PROCEDURE — 97530 THERAPEUTIC ACTIVITIES: CPT | Mod: GP | Performed by: PHYSICAL THERAPIST

## 2025-01-16 PROCEDURE — 97140 MANUAL THERAPY 1/> REGIONS: CPT | Mod: GP | Performed by: PHYSICAL THERAPIST

## 2025-01-16 PROCEDURE — 97161 PT EVAL LOW COMPLEX 20 MIN: CPT | Mod: GP | Performed by: PHYSICAL THERAPIST

## 2025-01-16 ASSESSMENT — ENCOUNTER SYMPTOMS
DEPRESSION: 0
OCCASIONAL FEELINGS OF UNSTEADINESS: 0
LOSS OF SENSATION IN FEET: 0

## 2025-01-16 NOTE — PROGRESS NOTES
Physical Therapy    PELVIC FLOOR EVALUATION AND TREATMENT    Name: Ana Cruz  MRN: [id number]  : 1995  Today's Date: 25     Time Calculation  Start Time: 805  Stop Time: 905  Time Calculation (min): 60 min    PT Evaluation Time Entry  PT Evaluation (Low) Time Entry: 25  PT Therapeutic Procedures Time Entry  Manual Therapy Time Entry: 10  Therapeutic Activity Time Entry: 15       Visit: 1  Insurance: [facility]  Auth: No   25 visits     Assessment:   Pt presents to clinic with chief complaint of internal, vaginal pain with perineal massage. Pt is currently 34 weeks and began perineal massage to reduce risk of tearing with this labor and delivery. Pt is having sharp pain with perineal internal massage that is being performed by her partner. Internal examination and perineal massage performed today in clinic with informed pt consent. She does have some mild tissue change and areas of tenderness L introitus vs R. Discussed utilizing static pressure hold vs swooping motion and keeping massage to single digit at a time with performance  Will also focus on flexibility for labor prep   Will most likely follow up and re-check after delivery. Instructed pt to call or email as she needs and we will schedule her a follow up        Plan:   Treatment/Interventions: Cryotherapy, Dry needling, Education/ Instruction, Electrical stimulation, Hot pack, Manual therapy, Neuromuscular re-education, Self care/ home management, Therapeutic activities, Therapeutic exercises  PT Plan: Skilled PT  PT Frequency: 1 time per week  Duration: 1 week (Pt will follow back up as needed; 34 weeks pregnant)  Onset Date: 25  Rehab Potential: Good  Plan of Care Agreement: Patient      Current Problem:  Problem List Items Addressed This Visit             ICD-10-CM    Pelvic pain in female R10.2    Relevant Orders    Follow Up In Physical Therapy         Subjective   General  Reason for Referral: Perineal pain during  pregnancy  Referred By: Patricia Stewart CNM  Preferred Learning Style: verbal, visual, written  Precautions  STEADI Fall Risk Score (The score of 4 or more indicates an increased risk of falling): 0  Precautions Comment: None       Objective   PELVIC HISTORY:    Chief Complaint/Description of Symptoms:   Pt presents to clinic with chief complaint of pain with perineal massage    HPI:   Pt has no pain at baseline, no pain with IC  She began perineal massage to reduce risk of tearing with delivery and she was experiencing sharp perineal pain, more L side vs R when her partner was performing perineal massage  She does have previous hx of grade 3 perineal tear with her first born   Pt currently 34 weeks pregnant with her son     Home Environment/Social Factors/Occupation:      Vaginal birth with her first son; suffered 3rd degree tearing; was seen in PFPT following delivery     PELVIC PAIN:     Description: sharp, jolt   Location: perineum; only with perineal massage     Since onset, the symptoms are: unchanged   Pain when emptying bladder: no  Pain with wiping or tight clothing: no  Pain with intercourse: no  History of back pain: No     EXERCISE:  Do you do Kegels? no   Current exercise regime:     BLADDER:     Excessive Urinary Urgency: yes d/t pregnancy   Daytime Voiding Frequency: WNL for pregnancy   Nighttime Voiding Frequency: No   Unintentional urine loss frequency: couple of times per week   Leakage occurs with: hard cough, sneeze    Leakage amount: small   Difficulty initiating flow of urine: no  Slow/weak urine stream: no  Difficulty starting urine stream/push to urinate: no  Able to completely empty bladder: Yes   Tests performed by doctor: internal per Patricia Stewart CNM   Frequent UTI's: No     BOWEL:     Excessive Bowel Urgency: No  BM Frequency: daily, every other day   Frequent Diarrhea: no  Frequent constipation/straining/incomplete emptying: no  [place] Stool Scale rating: Type 4    Unintentional stool  loss: No       EXTERNAL OBSERVATION:  Voluntary Contraction: Present   Voluntary Relaxation: present; incomplete        INTERNAL VAGINAL EXAMINATION:  PFM Strength: 2/5    INTERNAL PALPATION:   Mild tension noted bilat at introitus  Some tissue change noted L introitus vs R; mild pain with palpation with perineal massage on L; increased with swooping motion vs static stretch     Second and third layers of muscle not tested today     EXTERNAL PALPATION:  Levator Ani: no Pain/Tightness R, no Pain/Tightness L  Bulbo: no Pain/Tightness R, no Pain/Tightness L  Ischiocavernosus: no Pain/Tightness R, no Pain/Tightness L  Transverse perineum: no Pain/Tightness R, no Pain/Tightness L    Good soft tissue mobility at perineum externally     OUTCOMES MEASURE:  Female NIH Chronic Prostatitis Symptom index: 13      TREATMENT  Therapeutic activity:  Review and instruction on perineal massage; ok to continue every other day   Focus on static stretch vs swooping motion on L to reduce pain symptoms   Review to have pt's partner move just to first knuckle during performance vs any deeper; not necessary   Review of external perineal massage as well     Review of flexibility work for labor/delivery prep:  Milton pose  Cat cow   Deep squat with PF relaxation     Manual therapy:  Internal examination performed with informed pt consent   Internal perineal massage  External perineal massage        Careplan Goals:  Problem: PT Problem       Goal: Pt will be independent in perineal massage for reduced risk of tearing during labor/delivery          Goal: Pt will be independent in HEP for home maintenance            Sedrick Morrison, PT

## 2025-01-20 ENCOUNTER — TELEPHONE (OUTPATIENT)
Dept: OBSTETRICS AND GYNECOLOGY | Facility: CLINIC | Age: 30
End: 2025-01-20
Payer: COMMERCIAL

## 2025-01-27 ENCOUNTER — APPOINTMENT (OUTPATIENT)
Dept: OBSTETRICS AND GYNECOLOGY | Facility: CLINIC | Age: 30
End: 2025-01-27
Payer: COMMERCIAL

## 2025-01-27 VITALS — BODY MASS INDEX: 31.24 KG/M2 | WEIGHT: 182 LBS | SYSTOLIC BLOOD PRESSURE: 108 MMHG | DIASTOLIC BLOOD PRESSURE: 68 MMHG

## 2025-01-27 DIAGNOSIS — Z34.83 ENCOUNTER FOR SUPERVISION OF NORMAL PREGNANCY IN MULTIGRAVIDA IN THIRD TRIMESTER: Primary | ICD-10-CM

## 2025-01-27 DIAGNOSIS — O09.299 HX OF MATERNAL LACERATION, 3RD DEGREE, CURRENTLY PREGNANT (HHS-HCC): ICD-10-CM

## 2025-01-27 DIAGNOSIS — Z3A.35 35 WEEKS GESTATION OF PREGNANCY (HHS-HCC): ICD-10-CM

## 2025-01-27 PROCEDURE — 0501F PRENATAL FLOW SHEET: CPT

## 2025-01-27 ASSESSMENT — ENCOUNTER SYMPTOMS
CARDIOVASCULAR NEGATIVE: 0
ENDOCRINE NEGATIVE: 0
EYES NEGATIVE: 0
HEMATOLOGIC/LYMPHATIC NEGATIVE: 0
RESPIRATORY NEGATIVE: 0
NEUROLOGICAL NEGATIVE: 0
MUSCULOSKELETAL NEGATIVE: 0
ALLERGIC/IMMUNOLOGIC NEGATIVE: 0
CONSTITUTIONAL NEGATIVE: 0
PSYCHIATRIC NEGATIVE: 0
GASTROINTESTINAL NEGATIVE: 0

## 2025-01-27 NOTE — PROGRESS NOTES
Assessment/Plan   Diagnoses and all orders for this visit:  Encounter for supervision of normal pregnancy in multigravida in third trimester  35 weeks gestation of pregnancy ([facility])  Hx of maternal laceration, 3rd degree, currently pregnant ([facility])    Patient to continue with pelvic floor therapy and perineal massage.   Discussed routine GBS screening, to be completed next visit  Reviewed s/sx of PTL, warning signs, fetal movement counts, and when to call provider  Follow up in 1 week for a routine prenatal visit.    Patricia Stewart, ABRAM-SAULO    Subjective     Ana Cruz is a 29 y.o.  at 35w6d with a working estimated date of delivery of 2025, by Last Menstrual Period who presents for a routine prenatal visit. She denies vaginal bleeding, leakage of fluid, decreased fetal movements, or contractions.  Patient reports that she feels that the perineal message is getting better and more effective. Reports a reduction in pain.     Her pregnancy is complicated by:  Problem List Items Addressed This Visit          Ob-Gyn Problems    Hx of maternal laceration, 3rd degree, currently pregnant ([facility])    Overview     Patient is interested in potential induction at 39 wks.          35 weeks gestation of pregnancy ([facility])    Overview     Desired provider in labor: [x] CNM  [] Physician  [x] Blood Products: [x] Yes, accepts [] No, needs counseling  [x] Initial BMI: 27.28   [x] Prenatal Labs: WNL  [x] Cervical Cancer Screening up to date  [x] Rh status:  O POS  [x] Genetic Screening:  WNL, BOY  [x] NT US: (11-13 wks)  [x] Baby ASA (if indicated): NI  [x] Pregnancy dated by: LMP    [x] Anatomy US: (19-20 wks)  [x] Federal Sterilization consent signed (if indicated): NI  [x] 1hr GCT at 24-28wks: WNL  [x] Rhogam (if indicated): NI   [x] Fetal Surveillance (if indicated): NI  [x] Tdap (27-32 wks, may be given up to 36 wks if initial window missed):   [x] RSV (32-36 wks) (Sept. to end of ):   [x] Flu Vaccine:  patient reported.   [] Breastfeeding:  [] Postpartum Birth control method:   [] GBS at 36 - 37 wks:  [] 39 weeks discussion of IOL vs. Expectant management:  [] Mode of delivery ( anticipated ):            Other Visit Diagnoses       Encounter for supervision of normal pregnancy in multigravida in third trimester    -  Primary            Objective   Physical Exam:   Weight: 82.6 kg (182 lb)  Expected Total Weight Gain: 7 kg (15 lb)-11.5 kg (25 lb)   Pregravid BMI: 27.28  BP: 108/68  Fetal Heart Rate: 145 Fundal Height (cm): 35 cm Presentation: Vertex

## 2025-01-30 PROBLEM — Z3A.36 36 WEEKS GESTATION OF PREGNANCY (HHS-HCC): Status: ACTIVE | Noted: 2024-08-16

## 2025-02-03 ENCOUNTER — APPOINTMENT (OUTPATIENT)
Dept: OBSTETRICS AND GYNECOLOGY | Facility: CLINIC | Age: 30
End: 2025-02-03
Payer: COMMERCIAL

## 2025-02-03 VITALS — BODY MASS INDEX: 31.24 KG/M2 | DIASTOLIC BLOOD PRESSURE: 62 MMHG | SYSTOLIC BLOOD PRESSURE: 114 MMHG | WEIGHT: 182 LBS

## 2025-02-03 DIAGNOSIS — Z3A.37 37 WEEKS GESTATION OF PREGNANCY (HHS-HCC): ICD-10-CM

## 2025-02-03 DIAGNOSIS — Z34.93 PRENATAL CARE, THIRD TRIMESTER (HHS-HCC): ICD-10-CM

## 2025-02-03 DIAGNOSIS — Z3A.36 36 WEEKS GESTATION OF PREGNANCY (HHS-HCC): Primary | ICD-10-CM

## 2025-02-03 DIAGNOSIS — O09.299 HX OF MATERNAL LACERATION, 3RD DEGREE, CURRENTLY PREGNANT (HHS-HCC): ICD-10-CM

## 2025-02-03 PROCEDURE — 0501F PRENATAL FLOW SHEET: CPT

## 2025-02-03 NOTE — PROGRESS NOTES
Assessment/Plan   Diagnoses and all orders for this visit:  Prenatal care, third trimester ([facility])  36 weeks gestation of pregnancy ([facility])  -     Group B Streptococcus (GBS) Prenatal Screen, Culture    Discussed routine GBS screening, to be completed today  IOL to be scheduled. Plan to do cervical check and discuss methods of induction at next visit.   Reviewed s/sx of PTL, warning signs, fetal movement counts, and when to call provider  Follow up in 1 week for a routine prenatal visit.    ABRAM Freeman-SAULO    Subjective     Ana Cruz is a 29 y.o.  at 36w6d with a working estimated date of delivery of 2025, by Last Menstrual Period who presents for a routine prenatal visit. She denies vaginal bleeding, leakage of fluid, decreased fetal movements, or contractions.    Her pregnancy is complicated by:  Problem List Items Addressed This Visit          Ob-Gyn Problems    Hx of maternal laceration, 3rd degree, currently pregnant ([facility])    Overview     Patient is interested in potential induction at 39 wks.          36 weeks gestation of pregnancy ([facility]) - Primary    Overview     Desired provider in labor: [x] CNM  [] Physician  [x] Blood Products: [x] Yes, accepts [] No, needs counseling  [x] Initial BMI: 27.28   [x] Prenatal Labs: WNL  [x] Cervical Cancer Screening up to date  [x] Rh status:  O POS  [x] Genetic Screening:  WNL, BOY  [x] NT US: (11-13 wks)  [x] Baby ASA (if indicated): NI  [x] Pregnancy dated by: LMP    [x] Anatomy US: (19-20 wks)  [x] Federal Sterilization consent signed (if indicated): NI  [x] 1hr GCT at 24-28wks: WNL  [x] Rhogam (if indicated): NI   [x] Fetal Surveillance (if indicated): NI  [x] Tdap (27-32 wks, may be given up to 36 wks if initial window missed):   [x] RSV (32-36 wks) (Sept. to end of ):   [x] Flu Vaccine: patient reported.   [] Breastfeeding:  [] Postpartum Birth control method:   [x] GBS at 36 - 37 wks:  [x] 39 weeks discussion of IOL vs.  Expectant management: desires IOL at 39 weeks.   [x] Mode of delivery ( anticipated ): Vaginal             Other Visit Diagnoses       Prenatal care, third trimester ([facility])                Objective   Physical Exam:   Weight: 82.6 kg (182 lb)  Expected Total Weight Gain: 7 kg (15 lb)-11.5 kg (25 lb)   Pregravid BMI: 27.28  BP: 114/62  Fetal Heart Rate: 140 Fundal Height (cm): 36 cm Presentation: Vertex

## 2025-02-04 ENCOUNTER — TELEPHONE (OUTPATIENT)
Dept: OBSTETRICS AND GYNECOLOGY | Facility: CLINIC | Age: 30
End: 2025-02-04
Payer: COMMERCIAL

## 2025-02-04 NOTE — TELEPHONE ENCOUNTER
Called induction scheduling to get patient set up for IOL  Left vm for scheduling to return call.    RICK De RN    ----- Message from Patricia Stewart sent at 2/3/2025  4:36 PM EST -----  Regarding: IOL  This patient would like to be scheduled for a rrIOL @39 weeks. She is 37 weeks today with an TACO of 2/25/25.  She would like to delivery at [facility]. Please let me know if you have any questions or concerns, please pend and send me the order once scheduled. Thanks!

## 2025-02-05 ENCOUNTER — TELEPHONE (OUTPATIENT)
Dept: OBSTETRICS AND GYNECOLOGY | Facility: CLINIC | Age: 30
End: 2025-02-05
Payer: COMMERCIAL

## 2025-02-05 DIAGNOSIS — Z3A.37 37 WEEKS GESTATION OF PREGNANCY (HHS-HCC): ICD-10-CM

## 2025-02-05 NOTE — TELEPHONE ENCOUNTER
Called induction scheduling to get patient scheduled for rrIOL  Identified by name and   Patient scheduled for  at 2pm at [facility]    Called patient to discuss IOL  Identified by name and   Informed patient of date and time of IOL  Patient verbalized understanding, all questions/concerns addressed at this time.    ARUN DeN RN    ----- Message from Patricia Stewart sent at 2/3/2025  4:36 PM EST -----  Regarding: IOL  This patient would like to be scheduled for a rrIOL @39 weeks. She is 37 weeks today with an TACO of 25.  She would like to delivery at [facility]. Please let me know if you have any questions or concerns, please pend and send me the order once scheduled. Thanks!

## 2025-02-06 LAB — GP B STREP SPEC QL CULT: NORMAL

## 2025-02-10 ENCOUNTER — APPOINTMENT (OUTPATIENT)
Dept: OBSTETRICS AND GYNECOLOGY | Facility: CLINIC | Age: 30
End: 2025-02-10
Payer: COMMERCIAL

## 2025-02-10 VITALS — BODY MASS INDEX: 31.24 KG/M2 | SYSTOLIC BLOOD PRESSURE: 110 MMHG | WEIGHT: 182 LBS | DIASTOLIC BLOOD PRESSURE: 64 MMHG

## 2025-02-10 DIAGNOSIS — Z34.93 PRENATAL CARE, THIRD TRIMESTER (HHS-HCC): Primary | ICD-10-CM

## 2025-02-10 DIAGNOSIS — Z3A.37 37 WEEKS GESTATION OF PREGNANCY (HHS-HCC): ICD-10-CM

## 2025-02-10 DIAGNOSIS — O09.299 HX OF MATERNAL LACERATION, 3RD DEGREE, CURRENTLY PREGNANT (HHS-HCC): ICD-10-CM

## 2025-02-10 PROCEDURE — 0501F PRENATAL FLOW SHEET: CPT

## 2025-02-10 ASSESSMENT — ENCOUNTER SYMPTOMS
ENDOCRINE NEGATIVE: 0
NEUROLOGICAL NEGATIVE: 0
HEMATOLOGIC/LYMPHATIC NEGATIVE: 0
ALLERGIC/IMMUNOLOGIC NEGATIVE: 0
CONSTITUTIONAL NEGATIVE: 0
RESPIRATORY NEGATIVE: 0
EYES NEGATIVE: 0
MUSCULOSKELETAL NEGATIVE: 0
CARDIOVASCULAR NEGATIVE: 0
PSYCHIATRIC NEGATIVE: 0
GASTROINTESTINAL NEGATIVE: 0

## 2025-02-10 NOTE — PROGRESS NOTES
Assessment/Plan   Diagnoses and all orders for this visit:  Prenatal care, third trimester ([facility])  Hx of maternal laceration, 3rd degree, currently pregnant ([facility])  37 weeks gestation of pregnancy ([facility])      Discussed expectations and methods used for IOL  Patient has IOL scheduled.   Reviewed s/sx of labor, warning signs, fetal movement counts, and when to call provider  Follow up in 1 week for a routine prenatal visit.    Patricia Stewart, ABRAM-SAULO    Subjective   Ana Cruz is a 29 y.o.  at 37w6d with a working estimated date of delivery of 2025, by Last Menstrual Period who presents for a routine prenatal visit. She denies vaginal bleeding, leakage of fluid, decreased fetal movements, or contractions.      Her pregnancy is complicated by:  Problem List Items Addressed This Visit          Ob-Gyn Problems    Hx of maternal laceration, 3rd degree, currently pregnant ([facility])    Overview     Patient is interested in potential induction at 39 wks.          37 weeks gestation of pregnancy ([facility])    Overview     Desired provider in labor: [x] CNM  [] Physician  [x] Blood Products: [x] Yes, accepts [] No, needs counseling  [x] Initial BMI: 27.28   [x] Prenatal Labs: WNL  [x] Cervical Cancer Screening up to date  [x] Rh status:  O POS  [x] Genetic Screening:  WNL, BOY  [x] NT US: (11-13 wks)  [x] Baby ASA (if indicated): NI  [x] Pregnancy dated by: LMP    [x] Anatomy US: (19-20 wks)  [x] Federal Sterilization consent signed (if indicated): NI  [x] 1hr GCT at 24-28wks: WNL  [x] Rhogam (if indicated): NI   [x] Fetal Surveillance (if indicated): NI  [x] Tdap (27-32 wks, may be given up to 36 wks if initial window missed):   [x] RSV (32-36 wks) (Sept. to end of ):   [x] Flu Vaccine: patient reported.   [] Breastfeeding:  [] Postpartum Birth control method:   [x] GBS at 36 - 37 wks:  [x] 39 weeks discussion of IOL vs. Expectant management: desires IOL at 39 weeks.   [x] Mode of delivery (  anticipated ): Vaginal             Other Visit Diagnoses       Prenatal care, third trimester ([facility])    -  Primary            Objective   Physical Exam:   Weight: 82.6 kg (182 lb)  Expected Total Weight Gain: 7 kg (15 lb)-11.5 kg (25 lb)   Pregravid BMI: 27.28  BP: 110/64  Fetal Heart Rate: 130 Fundal Height (cm): 37 cm Presentation: Vertex         Methods of Labor Induction  An induction of labor can include a combination of any of the following methods:  _______________________________________________________________________________________________________________________________  Cervical Ripening   Ripening is the process of softening, thinning (effacing), and opening (dilating) the cervix. Your provider will use either medicine or mechanical techniques, or a combination of both, during this phase.     Medicine:  The most commonly used medication is Cytotec® (misoprostol). Your provider will insert it as a tablet into your vagina. This can be done every 3 hours until the cervix is about 3-4 cm dilated.    Prostaglandin (hormone) that softens and thins the cervix, may cause contractions  Has been used for approximately 25 years for labor induction  Cannot be used if you have had a prior  or surgery on your uterus  Can sometimes cause excessive uterine contractions (a medication called Terbutaline can be used to slow down the contractions)        Mechanical - Cervical Ripening Balloon:   A cervical ripening balloon is a device that causes the cervix to release natural prostaglandin hormones   A thin, flexible catheter is placed through the cervix using either a speculum or a digital exam  A small balloon at the end of the device is filled with water - this puts gentle, constant pressure on the cervix causing the release nature prostaglandins that help ripen the cervix.  As your cervix is opening, eventually the balloon will fall out; or the balloon can be deflated and removed if still in after 12  hours        _______________________________________________________________________________________________________________________________  Pitocin®  Pitocin® is a medication version of oxytocin - a hormone in your body that causes contractions to begin    Given through an IV (into the vein) line in your arm.   Started at a low dose - rate will be gradually increased every 30 minutes until contractions are occurring about every 2-3 minutes  Contractions help your cervix thin and dilate  Most patients become aware of their contractions about 30 to 60 minutes after starting Pitocin® -  you may feel the contractions as mild, period-like cramping that usually becomes stronger and more frequent as labor progresses     _______________________________________________________________________________________________________________________________  Rupture of Membranes (water breaking)  Rupture of membranes means that the bag containing the amniotic fluid around the baby has broken.     To break your bag of water, your cervix needs to be at least 2-3 cm dilated and baby's head needs to be well applied to your cervix.    Your provider will insert a tiny plastic hook into your vagina during a cervical exam to break your water - you may feel the amniotic fluid running out as the membranes break.   Your contractions are likely to become stronger and closer together, helping your labor progress      _______________________________________________________________________________________________________________________________    What will my induction look like?  If you desire an induction of labor for risk reduction, an appointment will be scheduled on the labor and delivery unit (L&D) for a date and time as early as 39 weeks (1 week prior to your due date).    Date and time of induction is subject to change or may be postponed depending on how busy L&D is or availability of nurse staffing.   Please be sure to eat a healthy  meal before you come in for your labor induction unless your care team tells you not to.        First labor and/or cervix = 1 cm or less       Prior vaginal delivery and/or first labor cervix = 2-3 cm          You and your baby will be closely monitored throughout all phases of labor. You are encouraged to change positions and move around out of the bed. Cervical exam frequency will depend on your individual labor progress. Progress means your cervix continues to dilate and efface and/or the baby is moving lower into your pelvis. The duration of labor depends on how long it takes your cervix to thin and open, and when contractions begin.     If you and your baby are doing well,          the goal is to be patient and make every effort to have a vaginal birth.    If your cervix is dilated less than 6 centimeters, the goal is to allow at least 12 to 18 hours after rupture of membranes and being on Pitocin® to progress into active labor before we consider discussing a . If labor does not progress at this stage, it is called “failed induction of labor.”      If your cervix is dilated 6 centimeters or more, the goal is to have strong, effective contractions and for labor to progress at least every 4 to 6 hours before we consider discussing a . If there are health and safety concerns about you or your baby, your care team may recommend a . If labor progress has stalled at this stage, it is called “arrest of active labor”.     If you have any questions at any time about your labor, please ask. A safe delivery is a team effort and you are a valuable member of the team. Our goal is to include you in decisions about your care.

## 2025-02-17 ENCOUNTER — APPOINTMENT (OUTPATIENT)
Dept: OBSTETRICS AND GYNECOLOGY | Facility: CLINIC | Age: 30
End: 2025-02-17
Payer: COMMERCIAL

## 2025-02-17 VITALS — BODY MASS INDEX: 31.27 KG/M2 | WEIGHT: 182.2 LBS | SYSTOLIC BLOOD PRESSURE: 118 MMHG | DIASTOLIC BLOOD PRESSURE: 72 MMHG

## 2025-02-17 DIAGNOSIS — Z3A.38 38 WEEKS GESTATION OF PREGNANCY (HHS-HCC): ICD-10-CM

## 2025-02-17 DIAGNOSIS — Z34.93 PRENATAL CARE, THIRD TRIMESTER (HHS-HCC): Primary | ICD-10-CM

## 2025-02-17 DIAGNOSIS — O09.299 HX OF MATERNAL LACERATION, 3RD DEGREE, CURRENTLY PREGNANT (HHS-HCC): ICD-10-CM

## 2025-02-17 PROCEDURE — 0501F PRENATAL FLOW SHEET: CPT

## 2025-02-17 NOTE — PROGRESS NOTES
Assessment/Plan   Diagnoses and all orders for this visit:  Prenatal care, third trimester ([facility])  Hx of maternal laceration, 3rd degree, currently pregnant ([facility])  38 weeks gestation of pregnancy ([facility])      IOL scheduled reviewed likley pitocin induction.   Reviewed s/sx of labor, warning signs, fetal movement counts, and when to call provider  Follow up for IOL, and with routine postpartum visit.     Patricia Stewart, ABRAM-SAULO    Subjective     Ana Cruz is a 29 y.o.  at 38w6d with a working estimated date of delivery of 2025, by Last Menstrual Period who presents for a routine prenatal visit. She denies vaginal bleeding, leakage of fluid, decreased fetal movements, or contractions.  Pt endorses no questions or concerns.     Her pregnancy is complicated by:  Problem List Items Addressed This Visit          Ob-Gyn Problems    Hx of maternal laceration, 3rd degree, currently pregnant ([facility])    Overview     Patient is interested in potential induction at 39 wks.          38 weeks gestation of pregnancy ([facility])    Overview     Desired provider in labor: [x] CNM  [] Physician  [x] Blood Products: [x] Yes, accepts [] No, needs counseling  [x] Initial BMI: 27.28   [x] Prenatal Labs: WNL  [x] Cervical Cancer Screening up to date  [x] Rh status:  O POS  [x] Genetic Screening:  WNL, BOY  [x] NT US: (11-13 wks)  [x] Baby ASA (if indicated): NI  [x] Pregnancy dated by: LMP    [x] Anatomy US: (19-20 wks)  [x] Federal Sterilization consent signed (if indicated): NI  [x] 1hr GCT at 24-28wks: WNL  [x] Rhogam (if indicated): NI   [x] Fetal Surveillance (if indicated): NI  [x] Tdap (27-32 wks, may be given up to 36 wks if initial window missed):   [x] RSV (32-36 wks) (Sept. to end of ):   [x] Flu Vaccine: patient reported.   [] Breastfeeding:  [] Postpartum Birth control method:   [x] GBS at 36 - 37 wks:  [x] 39 weeks discussion of IOL vs. Expectant management: desires IOL at 39 weeks.   [x] Mode  of delivery ( anticipated ): Vaginal             Other Visit Diagnoses       Prenatal care, third trimester ([facility])    -  Primary            Objective   Physical Exam:   Weight: 82.6 kg (182 lb 3.2 oz)  Expected Total Weight Gain: 7 kg (15 lb)-11.5 kg (25 lb)   Pregravid BMI: 27.28  BP: 118/72  Fetal Heart Rate: 135 Fundal Height (cm): 38 cm Presentation: Vertex  Dilation: 4 Effacement (%): 70 Fetal Station: -2

## 2025-02-20 ENCOUNTER — ANESTHESIA EVENT (OUTPATIENT)
Dept: OBSTETRICS AND GYNECOLOGY | Facility: HOSPITAL | Age: 30
End: 2025-02-20
Payer: COMMERCIAL

## 2025-02-20 ENCOUNTER — APPOINTMENT (OUTPATIENT)
Dept: OBSTETRICS AND GYNECOLOGY | Facility: HOSPITAL | Age: 30
End: 2025-02-20
Payer: COMMERCIAL

## 2025-02-20 ENCOUNTER — ANESTHESIA (OUTPATIENT)
Dept: OBSTETRICS AND GYNECOLOGY | Facility: HOSPITAL | Age: 30
End: 2025-02-20
Payer: COMMERCIAL

## 2025-02-20 ENCOUNTER — HOSPITAL ENCOUNTER (INPATIENT)
Facility: HOSPITAL | Age: 30
LOS: 3 days | Discharge: HOME | End: 2025-02-23
Attending: STUDENT IN AN ORGANIZED HEALTH CARE EDUCATION/TRAINING PROGRAM
Payer: COMMERCIAL

## 2025-02-20 DIAGNOSIS — R10.2 PELVIC PAIN IN FEMALE: ICD-10-CM

## 2025-02-20 DIAGNOSIS — Z3A.37 37 WEEKS GESTATION OF PREGNANCY (HHS-HCC): ICD-10-CM

## 2025-02-20 PROBLEM — N40.0 BPH (BENIGN PROSTATIC HYPERPLASIA): Status: ACTIVE | Noted: 2025-02-20

## 2025-02-20 PROBLEM — O99.013 ANEMIA DURING PREGNANCY IN THIRD TRIMESTER (HHS-HCC): Status: ACTIVE | Noted: 2025-02-20

## 2025-02-20 PROBLEM — Z3A.38 38 WEEKS GESTATION OF PREGNANCY (HHS-HCC): Status: ACTIVE | Noted: 2024-08-16

## 2025-02-20 PROBLEM — B35.9 TINEA: Status: RESOLVED | Noted: 2023-03-10 | Resolved: 2025-02-20

## 2025-02-20 PROBLEM — Z34.90 ENCOUNTER FOR INDUCTION OF LABOR: Status: ACTIVE | Noted: 2025-02-20

## 2025-02-20 LAB
ABO GROUP (TYPE) IN BLOOD: NORMAL
ANTIBODY SCREEN: NORMAL
ERYTHROCYTE [DISTWIDTH] IN BLOOD BY AUTOMATED COUNT: 16.3 % (ref 11.5–14.5)
HCT VFR BLD AUTO: 35 % (ref 36–46)
HGB BLD-MCNC: 10.9 G/DL (ref 12–16)
MCH RBC QN AUTO: 24.4 PG (ref 26–34)
MCHC RBC AUTO-ENTMCNC: 31.1 G/DL (ref 32–36)
MCV RBC AUTO: 78 FL (ref 80–100)
NRBC BLD-RTO: 0 /100 WBCS (ref 0–0)
PLATELET # BLD AUTO: 265 X10*3/UL (ref 150–450)
RBC # BLD AUTO: 4.47 X10*6/UL (ref 4–5.2)
RH FACTOR (ANTIGEN D): NORMAL
TREPONEMA PALLIDUM IGG+IGM AB [PRESENCE] IN SERUM OR PLASMA BY IMMUNOASSAY: NONREACTIVE
WBC # BLD AUTO: 9.8 X10*3/UL (ref 4.4–11.3)

## 2025-02-20 PROCEDURE — 2500000004 HC RX 250 GENERAL PHARMACY W/ HCPCS (ALT 636 FOR OP/ED)

## 2025-02-20 PROCEDURE — 7210000002 HC LABOR PER HOUR

## 2025-02-20 PROCEDURE — 3E033VJ INTRODUCTION OF OTHER HORMONE INTO PERIPHERAL VEIN, PERCUTANEOUS APPROACH: ICD-10-PCS | Performed by: SPECIALIST

## 2025-02-20 PROCEDURE — 3700000014 EPIDURAL BLOCK: Performed by: STUDENT IN AN ORGANIZED HEALTH CARE EDUCATION/TRAINING PROGRAM

## 2025-02-20 PROCEDURE — 01967 NEURAXL LBR ANES VAG DLVR: CPT | Performed by: STUDENT IN AN ORGANIZED HEALTH CARE EDUCATION/TRAINING PROGRAM

## 2025-02-20 PROCEDURE — 59050 FETAL MONITOR W/REPORT: CPT

## 2025-02-20 PROCEDURE — 10907ZC DRAINAGE OF AMNIOTIC FLUID, THERAPEUTIC FROM PRODUCTS OF CONCEPTION, VIA NATURAL OR ARTIFICIAL OPENING: ICD-10-PCS | Performed by: SPECIALIST

## 2025-02-20 PROCEDURE — 86780 TREPONEMA PALLIDUM: CPT

## 2025-02-20 PROCEDURE — 2500000004 HC RX 250 GENERAL PHARMACY W/ HCPCS (ALT 636 FOR OP/ED): Performed by: ADVANCED PRACTICE MIDWIFE

## 2025-02-20 PROCEDURE — 51701 INSERT BLADDER CATHETER: CPT

## 2025-02-20 PROCEDURE — 86900 BLOOD TYPING SEROLOGIC ABO: CPT

## 2025-02-20 PROCEDURE — 85027 COMPLETE CBC AUTOMATED: CPT

## 2025-02-20 PROCEDURE — 36415 COLL VENOUS BLD VENIPUNCTURE: CPT

## 2025-02-20 PROCEDURE — 1120000001 HC OB PRIVATE ROOM DAILY

## 2025-02-20 PROCEDURE — 2500000004 HC RX 250 GENERAL PHARMACY W/ HCPCS (ALT 636 FOR OP/ED): Performed by: ANESTHESIOLOGIST ASSISTANT

## 2025-02-20 RX ORDER — OXYTOCIN/0.9 % SODIUM CHLORIDE 30/500 ML
2-30 PLASTIC BAG, INJECTION (ML) INTRAVENOUS CONTINUOUS
Status: DISCONTINUED | OUTPATIENT
Start: 2025-02-20 | End: 2025-02-21

## 2025-02-20 RX ORDER — LIDOCAINE HYDROCHLORIDE 10 MG/ML
30 INJECTION, SOLUTION INFILTRATION; PERINEURAL ONCE AS NEEDED
Status: DISCONTINUED | OUTPATIENT
Start: 2025-02-20 | End: 2025-02-21 | Stop reason: HOSPADM

## 2025-02-20 RX ORDER — LOPERAMIDE HYDROCHLORIDE 2 MG/1
4 CAPSULE ORAL EVERY 2 HOUR PRN
Status: DISCONTINUED | OUTPATIENT
Start: 2025-02-20 | End: 2025-02-21 | Stop reason: HOSPADM

## 2025-02-20 RX ORDER — MISOPROSTOL 200 UG/1
800 TABLET ORAL ONCE AS NEEDED
Status: DISCONTINUED | OUTPATIENT
Start: 2025-02-20 | End: 2025-02-21 | Stop reason: HOSPADM

## 2025-02-20 RX ORDER — CARBOPROST TROMETHAMINE 250 UG/ML
250 INJECTION, SOLUTION INTRAMUSCULAR ONCE AS NEEDED
Status: DISCONTINUED | OUTPATIENT
Start: 2025-02-20 | End: 2025-02-21 | Stop reason: HOSPADM

## 2025-02-20 RX ORDER — METHYLERGONOVINE MALEATE 0.2 MG/ML
0.2 INJECTION INTRAVENOUS ONCE AS NEEDED
Status: DISCONTINUED | OUTPATIENT
Start: 2025-02-20 | End: 2025-02-21 | Stop reason: HOSPADM

## 2025-02-20 RX ORDER — OXYTOCIN/0.9 % SODIUM CHLORIDE 30/500 ML
60 PLASTIC BAG, INJECTION (ML) INTRAVENOUS ONCE AS NEEDED
Status: DISCONTINUED | OUTPATIENT
Start: 2025-02-20 | End: 2025-02-21 | Stop reason: HOSPADM

## 2025-02-20 RX ORDER — TERBUTALINE SULFATE 1 MG/ML
0.25 INJECTION SUBCUTANEOUS ONCE AS NEEDED
Status: DISCONTINUED | OUTPATIENT
Start: 2025-02-20 | End: 2025-02-21 | Stop reason: HOSPADM

## 2025-02-20 RX ORDER — FENTANYL/ROPIVACAINE/NS/PF 2MCG/ML-.2
0-25 PLASTIC BAG, INJECTION (ML) INJECTION CONTINUOUS
Status: DISCONTINUED | OUTPATIENT
Start: 2025-02-20 | End: 2025-02-21

## 2025-02-20 RX ORDER — TRANEXAMIC ACID 100 MG/ML
1000 INJECTION, SOLUTION INTRAVENOUS ONCE AS NEEDED
Status: DISCONTINUED | OUTPATIENT
Start: 2025-02-20 | End: 2025-02-21 | Stop reason: HOSPADM

## 2025-02-20 RX ORDER — ONDANSETRON HYDROCHLORIDE 2 MG/ML
4 INJECTION, SOLUTION INTRAVENOUS EVERY 6 HOURS PRN
Status: DISCONTINUED | OUTPATIENT
Start: 2025-02-20 | End: 2025-02-21 | Stop reason: HOSPADM

## 2025-02-20 RX ORDER — HYDRALAZINE HYDROCHLORIDE 20 MG/ML
5 INJECTION INTRAMUSCULAR; INTRAVENOUS ONCE AS NEEDED
Status: DISCONTINUED | OUTPATIENT
Start: 2025-02-20 | End: 2025-02-21 | Stop reason: HOSPADM

## 2025-02-20 RX ORDER — SODIUM CHLORIDE, SODIUM LACTATE, POTASSIUM CHLORIDE, CALCIUM CHLORIDE 600; 310; 30; 20 MG/100ML; MG/100ML; MG/100ML; MG/100ML
75 INJECTION, SOLUTION INTRAVENOUS CONTINUOUS
Status: DISCONTINUED | OUTPATIENT
Start: 2025-02-20 | End: 2025-02-21

## 2025-02-20 RX ORDER — LABETALOL HYDROCHLORIDE 5 MG/ML
20 INJECTION, SOLUTION INTRAVENOUS ONCE AS NEEDED
Status: DISCONTINUED | OUTPATIENT
Start: 2025-02-20 | End: 2025-02-21 | Stop reason: HOSPADM

## 2025-02-20 RX ORDER — LIDOCAINE HYDROCHLORIDE AND EPINEPHRINE 15; 5 MG/ML; UG/ML
INJECTION, SOLUTION EPIDURAL AS NEEDED
Status: DISCONTINUED | OUTPATIENT
Start: 2025-02-20 | End: 2025-02-21

## 2025-02-20 RX ORDER — ONDANSETRON 4 MG/1
4 TABLET, FILM COATED ORAL EVERY 6 HOURS PRN
Status: DISCONTINUED | OUTPATIENT
Start: 2025-02-20 | End: 2025-02-21 | Stop reason: HOSPADM

## 2025-02-20 RX ORDER — OXYTOCIN 10 [USP'U]/ML
10 INJECTION, SOLUTION INTRAMUSCULAR; INTRAVENOUS ONCE AS NEEDED
Status: DISCONTINUED | OUTPATIENT
Start: 2025-02-20 | End: 2025-02-21 | Stop reason: HOSPADM

## 2025-02-20 RX ORDER — NIFEDIPINE 10 MG/1
10 CAPSULE ORAL ONCE AS NEEDED
Status: DISCONTINUED | OUTPATIENT
Start: 2025-02-20 | End: 2025-02-21 | Stop reason: HOSPADM

## 2025-02-20 RX ADMIN — SODIUM CHLORIDE, POTASSIUM CHLORIDE, SODIUM LACTATE AND CALCIUM CHLORIDE 75 ML/HR: 600; 310; 30; 20 INJECTION, SOLUTION INTRAVENOUS at 14:49

## 2025-02-20 RX ADMIN — Medication 3 ML: at 20:31

## 2025-02-20 RX ADMIN — Medication 2 MILLI-UNITS/MIN: at 15:30

## 2025-02-20 RX ADMIN — LIDOCAINE HYDROCHLORIDE AND EPINEPHRINE 3 ML: 15; 5 INJECTION, SOLUTION EPIDURAL at 20:10

## 2025-02-20 RX ADMIN — LIDOCAINE HYDROCHLORIDE AND EPINEPHRINE 2 ML: 15; 5 INJECTION, SOLUTION EPIDURAL at 20:11

## 2025-02-20 RX ADMIN — Medication 10 ML/HR: at 20:12

## 2025-02-20 RX ADMIN — SODIUM CHLORIDE 500 ML: 9 INJECTION, SOLUTION INTRAVENOUS at 19:48

## 2025-02-20 RX ADMIN — Medication 3 ML: at 20:14

## 2025-02-20 SDOH — ECONOMIC STABILITY: HOUSING INSECURITY: DO YOU FEEL UNSAFE GOING BACK TO THE PLACE WHERE YOU ARE LIVING?: NO

## 2025-02-20 SDOH — HEALTH STABILITY: MENTAL HEALTH: SUICIDAL BEHAVIOR (LIFETIME): NO

## 2025-02-20 SDOH — HEALTH STABILITY: MENTAL HEALTH: HAVE YOU USED ANY PRESCRIPTION DRUGS OTHER THAN PRESCRIBED IN THE PAST 12 MONTHS?: NO

## 2025-02-20 SDOH — SOCIAL STABILITY: SOCIAL INSECURITY
WITHIN THE LAST YEAR, HAVE YOU BEEN RAPED OR FORCED TO HAVE ANY KIND OF SEXUAL ACTIVITY BY YOUR PARTNER OR EX-PARTNER?: NO

## 2025-02-20 SDOH — HEALTH STABILITY: MENTAL HEALTH: HOW OFTEN DO YOU HAVE A DRINK CONTAINING ALCOHOL?: NEVER

## 2025-02-20 SDOH — ECONOMIC STABILITY: FOOD INSECURITY: WITHIN THE PAST 12 MONTHS, YOU WORRIED THAT YOUR FOOD WOULD RUN OUT BEFORE YOU GOT THE MONEY TO BUY MORE.: NEVER TRUE

## 2025-02-20 SDOH — SOCIAL STABILITY: SOCIAL INSECURITY: WITHIN THE LAST YEAR, HAVE YOU BEEN AFRAID OF YOUR PARTNER OR EX-PARTNER?: NO

## 2025-02-20 SDOH — ECONOMIC STABILITY: FOOD INSECURITY: WITHIN THE PAST 12 MONTHS, THE FOOD YOU BOUGHT JUST DIDN'T LAST AND YOU DIDN'T HAVE MONEY TO GET MORE.: NEVER TRUE

## 2025-02-20 SDOH — HEALTH STABILITY: MENTAL HEALTH: HAVE YOU USED ANY SUBSTANCES (CANABIS, COCAINE, HEROIN, HALLUCINOGENS, INHALANTS, ETC.) IN THE PAST 12 MONTHS?: NO

## 2025-02-20 SDOH — SOCIAL STABILITY: SOCIAL INSECURITY: HAS ANYONE EVER THREATENED TO HURT YOUR FAMILY OR YOUR PETS?: NO

## 2025-02-20 SDOH — SOCIAL STABILITY: SOCIAL INSECURITY: DO YOU FEEL ANYONE HAS EXPLOITED OR TAKEN ADVANTAGE OF YOU FINANCIALLY OR OF YOUR PERSONAL PROPERTY?: NO

## 2025-02-20 SDOH — HEALTH STABILITY: MENTAL HEALTH: WISH TO BE DEAD (PAST 1 MONTH): NO

## 2025-02-20 SDOH — SOCIAL STABILITY: SOCIAL INSECURITY
WITHIN THE LAST YEAR, HAVE YOU BEEN KICKED, HIT, SLAPPED, OR OTHERWISE PHYSICALLY HURT BY YOUR PARTNER OR EX-PARTNER?: NO

## 2025-02-20 SDOH — HEALTH STABILITY: MENTAL HEALTH: HOW OFTEN DO YOU HAVE SIX OR MORE DRINKS ON ONE OCCASION?: NEVER

## 2025-02-20 SDOH — SOCIAL STABILITY: SOCIAL INSECURITY: WITHIN THE LAST YEAR, HAVE YOU BEEN HUMILIATED OR EMOTIONALLY ABUSED IN OTHER WAYS BY YOUR PARTNER OR EX-PARTNER?: NO

## 2025-02-20 SDOH — SOCIAL STABILITY: SOCIAL INSECURITY: ABUSE SCREEN: ADULT

## 2025-02-20 SDOH — HEALTH STABILITY: MENTAL HEALTH: HOW MANY DRINKS CONTAINING ALCOHOL DO YOU HAVE ON A TYPICAL DAY WHEN YOU ARE DRINKING?: PATIENT DOES NOT DRINK

## 2025-02-20 SDOH — SOCIAL STABILITY: SOCIAL INSECURITY: PHYSICAL ABUSE: DENIES

## 2025-02-20 SDOH — HEALTH STABILITY: MENTAL HEALTH: NON-SPECIFIC ACTIVE SUICIDAL THOUGHTS (PAST 1 MONTH): NO

## 2025-02-20 SDOH — SOCIAL STABILITY: SOCIAL INSECURITY: ARE YOU OR HAVE YOU BEEN THREATENED OR ABUSED PHYSICALLY, EMOTIONALLY, OR SEXUALLY BY ANYONE?: NO

## 2025-02-20 SDOH — HEALTH STABILITY: MENTAL HEALTH: WERE YOU ABLE TO COMPLETE ALL THE BEHAVIORAL HEALTH SCREENINGS?: YES

## 2025-02-20 SDOH — SOCIAL STABILITY: SOCIAL INSECURITY: DOES ANYONE TRY TO KEEP YOU FROM HAVING/CONTACTING OTHER FRIENDS OR DOING THINGS OUTSIDE YOUR HOME?: NO

## 2025-02-20 SDOH — SOCIAL STABILITY: SOCIAL INSECURITY: ARE THERE ANY APPARENT SIGNS OF INJURIES/BEHAVIORS THAT COULD BE RELATED TO ABUSE/NEGLECT?: NO

## 2025-02-20 SDOH — SOCIAL STABILITY: SOCIAL INSECURITY: HAVE YOU HAD ANY THOUGHTS OF HARMING ANYONE ELSE?: NO

## 2025-02-20 SDOH — SOCIAL STABILITY: SOCIAL INSECURITY: HAVE YOU HAD THOUGHTS OF HARMING ANYONE ELSE?: NO

## 2025-02-20 SDOH — SOCIAL STABILITY: SOCIAL INSECURITY: VERBAL ABUSE: DENIES

## 2025-02-20 ASSESSMENT — PATIENT HEALTH QUESTIONNAIRE - PHQ9
1. LITTLE INTEREST OR PLEASURE IN DOING THINGS: NOT AT ALL
2. FEELING DOWN, DEPRESSED OR HOPELESS: NOT AT ALL
SUM OF ALL RESPONSES TO PHQ9 QUESTIONS 1 & 2: 0

## 2025-02-20 ASSESSMENT — PAIN SCALES - GENERAL
PAINLEVEL_OUTOF10: 0 - NO PAIN

## 2025-02-20 ASSESSMENT — LIFESTYLE VARIABLES
SKIP TO QUESTIONS 9-10: 1
AUDIT-C TOTAL SCORE: 0

## 2025-02-20 ASSESSMENT — ACTIVITIES OF DAILY LIVING (ADL): LACK_OF_TRANSPORTATION: NO

## 2025-02-20 NOTE — LETTER
"      To Whom It May Concern,     Baby boy \"Oscar\" Anthony (Christian) was born on 2[birth year] at 0200 to Ana Cunningham.  Beni Gonzales, father of the baby, was present during the labor and delivery and postpartum period at [facility] on the days of 2/20/25-2/23/2025.  For any questions or concerns, please contact [facility] Medical Records at [phone]      Thank you,    Michelle Garcias RN  [facility]'s [facility], Postpartum Unit    "

## 2025-02-20 NOTE — HOSPITAL COURSE
HOT PREP: Please do not transfer to handoff until all auto-populated fields are complete  -----------------------------------------------------  SUMMARY SECTION:    Ana Cruz is a Gestational Age: <None> {baby size:09093} female born 1995 at  via  to a This patient's mother is not on file. This patient's mother is not on file. mother, with blood type O+, ab-, GBS neg, and PNS wnl. bw No birth weight on file., with active issues of anemia, BMI 30, who presents to the  nursery for routine  care .     Presenting for IOL  Cephalic presentation     complications: {perinatalcomps:34589}    Delivery history:  Code Pink Level *** for ***  Apgars   at 1min,  at 5min  Resuscitation: This patient has no babies on file.  Rupture of Membranes Duration: This patient's mother is not on file.  Fluid: ***    Pregnancy history:  Abnormal Labs: anemia Hgb 10.9,   Ultrasounds: wnl This patient has no babies on file.  Pregnancy complications/maternal PMH:  none  BMI 30  Maternal meds: PNV, iron    Measurements/Kasia percentiles:  Birth Weight: No birth weight on file. (Facility age limit for growth %maryjo is 20 years.)  Length: This patient has no babies on file. (Facility age limit for growth %maryjo is 20 years.)  Head circumference: This patient has no babies on file. (Facility age limit for growth %maryjo is 20 years.)    __________________________________________________________________________    COVERAGE TO DO:    Ana Cruz is a Gestational Age: <None> {baby size:11506} female bw No birth weight on file.  on 1995 at      ACTIVE ISSUES:   Routine  care    FEEDING PLAN: {Plan; breastfeedin}    BILI  Neurotoxicity risk factors present?  {YES-DESCRIBE/NO:37225}  - Mom blood type: O+, ab -  - Baby's blood type: *** , G6PD: ***  Q12H TcB:  *** @ *** HOL, LL ***  *** @ *** HOL, LL ***    SEPSIS  Sepsis Risk score: Sepsis Risk Factors: *** (if high risk)  Overall  ***;   Well ***;    Equivocal *** ;  Ill: ***.  Action points:***    HYPOGLYCEMIA  At-Risk for Hypoglycemia?: No    TO DO:  [ ] routine  care  ------------------------------------------------------------------------------  DISCHARGE PLANNING:    Anticipated Discharge: ***  Screening/Prevention  [x] Admission Syphilis screen: negative  [***] Vitamin K: {Yes, No:89835}  [***] Erythromycin: {Yes, No:23259}  [***] HEP B Vaccine consent: {Yes/No/Refuse:34420}; Date received: ***  [***] NBS Done: {YES/DATE/NO:88619}  [***] Hearing Screen: {[facility] christine hearing screen pass / fail:29306}  [***] Congenital Heart Screen: {pass/fail:81408:::1}  [***] Circumcision consent: {DONE/NOT DONE:16531}; Ordered {Yes, No:80029}  [x] Maternal RSV - yes on 25  [***] Follow-up: Physician: Chetna Neumann   [***] Appointment date & time: ***  Other Problems:  [***] ***  ------------------------------------------------------------------------------------------  Helpful INFO:    Mother's Information  Prenatal labs:   This patient's mother is not on file.  Toxicology:   This patient's mother is not on file.  Labs:  This patient's mother is not on file.  Fetal Imaging:  This patient's mother is not on file.    Maternal History and Problem List:   This patient's mother is not on file.  Maternal Home Medications:   This patient's mother is not on file.  Social History: This patient's mother is not on file.This patient's mother is not on file.

## 2025-02-20 NOTE — H&P
OB Admission H&P    Assessment/Plan    Ana Cruz is a 29 y.o.  at 39w2d, TACO: 2025, by Last Menstrual Period, who presents for Induction of Labor.    Plan  -Admit to L&D, consented  -T&S, CBC, and Syphilis  -Epidural at patient request  -Recheck as clinically indicated by maternal or fetal status  -Plan to initiate induction with pitocin.     Fetal Status  -NST reactive, reassuring   -Presentation cephalic based on vaginal exam and ultrasound   -EFW 7lbs  by Leopolds   -GBS negative    Postpartum  Contraception Plan: patient declined    REINALDO Freeman      Pregnancy Problems (from 24 to present)       Problem Noted Diagnosed Resolved    38 weeks gestation of pregnancy ([facility]) 2024 by REINALDO Freeman  No    Priority:  Medium       Overview Addendum 2/3/2025  4:41 PM by REINALDO Freeman     Desired provider in labor: [x] CNM  [] Physician  [x] Blood Products: [x] Yes, accepts [] No, needs counseling  [x] Initial BMI: 27.28   [x] Prenatal Labs: WNL  [x] Cervical Cancer Screening up to date  [x] Rh status:  O POS  [x] Genetic Screening:  WNL, BOY  [x] NT US: (11-13 wks)  [x] Baby ASA (if indicated): NI  [x] Pregnancy dated by: LMP    [x] Anatomy US: (19-20 wks)  [x] Federal Sterilization consent signed (if indicated): NI  [x] 1hr GCT at 24-28wks: WNL  [x] Rhogam (if indicated): NI   [x] Fetal Surveillance (if indicated): NI  [x] Tdap (27-32 wks, may be given up to 36 wks if initial window missed):   [x] RSV (32-36 wks) (Sept. to end of ):   [x] Flu Vaccine: patient reported.   [] Breastfeeding:  [] Postpartum Birth control method:   [x] GBS at 36 - 37 wks:  [x] 39 weeks discussion of IOL vs. Expectant management: desires IOL at 39 weeks.   [x] Mode of delivery ( anticipated ): Vaginal            Hx of maternal laceration, 3rd degree, currently pregnant ([facility]) 2024 by REINALDO Freeman  No    Priority:  Medium       Overview Signed 2024   6:24 PM by REINALDO Freeman     Patient is interested in potential induction at 39 wks.                  Subjective   Good fetal movement.  Denies vaginal bleeding., Denies contractions., Denies leaking of fluid.      Prenatal Provider: REINALDO Up (me)     OB History    Para Term  AB Living   2 1 1 0 0 1   SAB IAB Ectopic Multiple Live Births   0 0 0 0 1      # Outcome Date GA Lbr Lito/2nd Weight Sex Type Anes PTL Lv   2 Current            1 Term      Vag-Spont   CHARISSE       Past Surgical History:   Procedure Laterality Date    OTHER SURGICAL HISTORY  2020    No history of surgery       Social History     Tobacco Use    Smoking status: Never    Smokeless tobacco: Never   Substance Use Topics    Alcohol use: Yes     Comment: social       No Known Allergies    Medications Prior to Admission   Medication Sig Dispense Refill Last Dose/Taking    cyclobenzaprine (Flexeril) 10 mg tablet Take 0.5 tablets (5 mg) by mouth 3 times a day for 10 days. 15 tablet 0     prenatal vit no.130-iron-folic (Prenatal Vitamin) 27 mg iron- 800 mcg tablet Take 1 tablet by mouth once daily.        Objective     Last Vitals  Temp Pulse Resp BP MAP O2 Sat                   Blood Pressures    No data found in the last 1 encounters.          Physical Exam  General: NAD, mood appropriate  Cardiopulmonary: warm and well perfused, breathing comfortably on room air  Abdomen: Gravid, non-tender  Extremities: Symmetric  Speculum Exam: deferred  Cervix:   /  /      Chaperone Present: Yes.  Chaperone Name/Title:   Examination Chaperoned: Gynecological Exam     Fetal Monitoring  Baseline: 145 bpm, Variability: moderate,  Accelerations: present and Decelerations: none  Uterine Activity: Contractions present  Interpretation: Reactive    Bedside ultrasound: No    Labs in chart were reviewed.  0   Lab Value Date/Time    GRPBSTREP SEE NOTE 2025 1620    GRPBSTREP NEGATIVE FOR GROUP B BETA STREP. 2023  1559     CBC   Recent Labs     02/20/25  1411   WBC 9.8   HGB 10.9*   HCT 35.0*                Prenatal labs reviewed, not remarkable.

## 2025-02-20 NOTE — LETTER
"      To Whom It May Concern,     Baby boy \"Oscar\" Anthony was born on [birth year] at 0200 to nAa Cruz.  Beni Gonzales, father of the baby, was present during the labor and delivery and postpartum period at [facility] on the days of 2/20/25-2/23/2025.  For any questions or concerns, please contact [facility] Medical Records at [phone]      Thank you,    Michelle Garcias RN  [facility]'s [place] [address], Postpartum Unit  "

## 2025-02-20 NOTE — ANESTHESIA PREPROCEDURE EVALUATION
Patient: Ana Cruz    Evaluation Method: In-person visit    Procedure Information    Date: 02/20/25  Procedure: Labor Consult         Relevant Problems   ID   (+) Tinea      GYN   (+) 38 weeks gestation of pregnancy ([facility])       Clinical information reviewed:   Tobacco  Allergies  Meds   Med Hx  Surg Hx   Fam Hx  Soc Hx        NPO Detail:  No data recorded     OB/Gyn Evaluation    Present Pregnancy    Patient is pregnant now.   Obstetric History                Physical Exam    Airway  Mallampati: II     Cardiovascular - normal exam     Dental - normal exam     Pulmonary - normal exam     Abdominal            Anesthesia Plan    History of general anesthesia?: no  History of complications of general anesthesia?: no    ASA 2     epidural

## 2025-02-20 NOTE — LETTER
"    To Whom It May Concern,     Baby boy \"Oscar\" Anthony was born on [birth year] at 0200 to Ana Duncanwillie.  Beni Gonzales, father of the baby, was present during the labor and delivery and postpartum period at [facility] on the days of 2/20/25-2/23/2025.  For any questions or concerns, please contact [facility] Medical Records at [phone]      Thank you,    Michelle Garcias RN  [facility]'s [place] [address], Postpartum Unit    "

## 2025-02-21 PROCEDURE — 7210000002 HC LABOR PER HOUR

## 2025-02-21 PROCEDURE — 2500000004 HC RX 250 GENERAL PHARMACY W/ HCPCS (ALT 636 FOR OP/ED): Performed by: ADVANCED PRACTICE MIDWIFE

## 2025-02-21 PROCEDURE — 2500000005 HC RX 250 GENERAL PHARMACY W/O HCPCS: Performed by: ADVANCED PRACTICE MIDWIFE

## 2025-02-21 PROCEDURE — 59409 OBSTETRICAL CARE: CPT | Performed by: ADVANCED PRACTICE MIDWIFE

## 2025-02-21 PROCEDURE — 59400 OBSTETRICAL CARE: CPT | Performed by: ADVANCED PRACTICE MIDWIFE

## 2025-02-21 PROCEDURE — 7100000016 HC LABOR RECOVERY PER HOUR

## 2025-02-21 PROCEDURE — 2500000001 HC RX 250 WO HCPCS SELF ADMINISTERED DRUGS (ALT 637 FOR MEDICARE OP): Performed by: ADVANCED PRACTICE MIDWIFE

## 2025-02-21 PROCEDURE — 0KQM0ZZ REPAIR PERINEUM MUSCLE, OPEN APPROACH: ICD-10-PCS | Performed by: SPECIALIST

## 2025-02-21 PROCEDURE — 1100000001 HC PRIVATE ROOM DAILY

## 2025-02-21 RX ORDER — HYDRALAZINE HYDROCHLORIDE 20 MG/ML
5 INJECTION INTRAMUSCULAR; INTRAVENOUS ONCE AS NEEDED
Status: DISCONTINUED | OUTPATIENT
Start: 2025-02-21 | End: 2025-02-23 | Stop reason: HOSPADM

## 2025-02-21 RX ORDER — SIMETHICONE 80 MG
80 TABLET,CHEWABLE ORAL 4 TIMES DAILY PRN
Status: DISCONTINUED | OUTPATIENT
Start: 2025-02-21 | End: 2025-02-23 | Stop reason: HOSPADM

## 2025-02-21 RX ORDER — IBUPROFEN 600 MG/1
600 TABLET ORAL EVERY 6 HOURS
Status: DISCONTINUED | OUTPATIENT
Start: 2025-02-21 | End: 2025-02-23 | Stop reason: HOSPADM

## 2025-02-21 RX ORDER — POLYETHYLENE GLYCOL 3350 17 G/17G
17 POWDER, FOR SOLUTION ORAL 2 TIMES DAILY PRN
Status: DISCONTINUED | OUTPATIENT
Start: 2025-02-21 | End: 2025-02-23 | Stop reason: HOSPADM

## 2025-02-21 RX ORDER — LOPERAMIDE HYDROCHLORIDE 2 MG/1
4 CAPSULE ORAL EVERY 2 HOUR PRN
Status: DISCONTINUED | OUTPATIENT
Start: 2025-02-21 | End: 2025-02-23 | Stop reason: HOSPADM

## 2025-02-21 RX ORDER — ENOXAPARIN SODIUM 100 MG/ML
40 INJECTION SUBCUTANEOUS EVERY 24 HOURS
Status: DISCONTINUED | OUTPATIENT
Start: 2025-02-21 | End: 2025-02-23 | Stop reason: HOSPADM

## 2025-02-21 RX ORDER — OXYTOCIN/0.9 % SODIUM CHLORIDE 30/500 ML
60 PLASTIC BAG, INJECTION (ML) INTRAVENOUS ONCE AS NEEDED
Status: DISCONTINUED | OUTPATIENT
Start: 2025-02-21 | End: 2025-02-23 | Stop reason: HOSPADM

## 2025-02-21 RX ORDER — ONDANSETRON HYDROCHLORIDE 2 MG/ML
4 INJECTION, SOLUTION INTRAVENOUS EVERY 6 HOURS PRN
Status: DISCONTINUED | OUTPATIENT
Start: 2025-02-21 | End: 2025-02-23 | Stop reason: HOSPADM

## 2025-02-21 RX ORDER — DIPHENHYDRAMINE HCL 25 MG
25 CAPSULE ORAL EVERY 6 HOURS PRN
Status: DISCONTINUED | OUTPATIENT
Start: 2025-02-21 | End: 2025-02-23 | Stop reason: HOSPADM

## 2025-02-21 RX ORDER — NIFEDIPINE 10 MG/1
10 CAPSULE ORAL ONCE AS NEEDED
Status: DISCONTINUED | OUTPATIENT
Start: 2025-02-21 | End: 2025-02-23 | Stop reason: HOSPADM

## 2025-02-21 RX ORDER — TRANEXAMIC ACID 100 MG/ML
1000 INJECTION, SOLUTION INTRAVENOUS ONCE AS NEEDED
Status: DISCONTINUED | OUTPATIENT
Start: 2025-02-21 | End: 2025-02-23 | Stop reason: HOSPADM

## 2025-02-21 RX ORDER — DIPHENHYDRAMINE HYDROCHLORIDE 50 MG/ML
25 INJECTION INTRAMUSCULAR; INTRAVENOUS EVERY 6 HOURS PRN
Status: DISCONTINUED | OUTPATIENT
Start: 2025-02-21 | End: 2025-02-23 | Stop reason: HOSPADM

## 2025-02-21 RX ORDER — LABETALOL HYDROCHLORIDE 5 MG/ML
20 INJECTION, SOLUTION INTRAVENOUS ONCE AS NEEDED
Status: DISCONTINUED | OUTPATIENT
Start: 2025-02-21 | End: 2025-02-23 | Stop reason: HOSPADM

## 2025-02-21 RX ORDER — ONDANSETRON 4 MG/1
4 TABLET, FILM COATED ORAL EVERY 6 HOURS PRN
Status: DISCONTINUED | OUTPATIENT
Start: 2025-02-21 | End: 2025-02-23 | Stop reason: HOSPADM

## 2025-02-21 RX ORDER — ACETAMINOPHEN 325 MG/1
975 TABLET ORAL EVERY 6 HOURS
Status: DISCONTINUED | OUTPATIENT
Start: 2025-02-21 | End: 2025-02-23 | Stop reason: HOSPADM

## 2025-02-21 RX ORDER — METHYLERGONOVINE MALEATE 0.2 MG/ML
0.2 INJECTION INTRAVENOUS ONCE AS NEEDED
Status: DISCONTINUED | OUTPATIENT
Start: 2025-02-21 | End: 2025-02-23 | Stop reason: HOSPADM

## 2025-02-21 RX ORDER — CARBOPROST TROMETHAMINE 250 UG/ML
250 INJECTION, SOLUTION INTRAMUSCULAR ONCE AS NEEDED
Status: DISCONTINUED | OUTPATIENT
Start: 2025-02-21 | End: 2025-02-23 | Stop reason: HOSPADM

## 2025-02-21 RX ORDER — ADHESIVE BANDAGE
10 BANDAGE TOPICAL
Status: DISCONTINUED | OUTPATIENT
Start: 2025-02-21 | End: 2025-02-23 | Stop reason: HOSPADM

## 2025-02-21 RX ORDER — MINERAL OIL
OIL (ML) ORAL
Status: DISPENSED
Start: 2025-02-21 | End: 2025-02-21

## 2025-02-21 RX ORDER — OXYTOCIN 10 [USP'U]/ML
10 INJECTION, SOLUTION INTRAMUSCULAR; INTRAVENOUS ONCE AS NEEDED
Status: DISCONTINUED | OUTPATIENT
Start: 2025-02-21 | End: 2025-02-23 | Stop reason: HOSPADM

## 2025-02-21 RX ORDER — MISOPROSTOL 200 UG/1
800 TABLET ORAL ONCE AS NEEDED
Status: DISCONTINUED | OUTPATIENT
Start: 2025-02-21 | End: 2025-02-23 | Stop reason: HOSPADM

## 2025-02-21 RX ADMIN — ACETAMINOPHEN 975 MG: 325 TABLET ORAL at 10:43

## 2025-02-21 RX ADMIN — IBUPROFEN 600 MG: 600 TABLET, FILM COATED ORAL at 22:03

## 2025-02-21 RX ADMIN — IBUPROFEN 600 MG: 600 TABLET, FILM COATED ORAL at 10:43

## 2025-02-21 RX ADMIN — ENOXAPARIN SODIUM 40 MG: 100 INJECTION SUBCUTANEOUS at 15:40

## 2025-02-21 RX ADMIN — ACETAMINOPHEN 975 MG: 325 TABLET ORAL at 22:03

## 2025-02-21 RX ADMIN — ACETAMINOPHEN 975 MG: 325 TABLET ORAL at 15:40

## 2025-02-21 RX ADMIN — IBUPROFEN 600 MG: 600 TABLET, FILM COATED ORAL at 15:40

## 2025-02-21 RX ADMIN — IBUPROFEN 600 MG: 600 TABLET, FILM COATED ORAL at 04:40

## 2025-02-21 RX ADMIN — WITCH HAZEL 1 EACH: 500 SOLUTION RECTAL; TOPICAL at 22:07

## 2025-02-21 RX ADMIN — ACETAMINOPHEN 975 MG: 325 TABLET ORAL at 04:40

## 2025-02-21 ASSESSMENT — PAIN DESCRIPTION - DESCRIPTORS
DESCRIPTORS: CRAMPING
DESCRIPTORS: DISCOMFORT;SORE

## 2025-02-21 ASSESSMENT — PAIN SCALES - GENERAL
PAINLEVEL_OUTOF10: 0 - NO PAIN
PAINLEVEL_OUTOF10: 2
PAINLEVEL_OUTOF10: 0 - NO PAIN
PAINLEVEL_OUTOF10: 0 - NO PAIN
PAINLEVEL_OUTOF10: 1
PAINLEVEL_OUTOF10: 0 - NO PAIN

## 2025-02-21 ASSESSMENT — PAIN DESCRIPTION - LOCATION: LOCATION: ABDOMEN

## 2025-02-21 ASSESSMENT — PAIN DESCRIPTION - ORIENTATION: ORIENTATION: LOWER

## 2025-02-21 NOTE — PROGRESS NOTES
"Assessment    29 y.o.  at 39w3d  FHT Category 1  Active labor    Plan    #IOL  Maternal repositioning  Continue pitocin per protocol  Continue assessment of maternal and fetal wellbeing  Recheck as clinically indicated by maternal or fetal status  Anticipate second stage of labor    Tracey Acosta, APRN-CNM, APRN-CNP    Subjective:  Ana Cruz is a 29 y.o.  at 39w3d admitted for eIOL. Comfortable, resting    Objective:  /63   Pulse 101   Temp 36.1 °C (97 °F) (Temporal)   Resp 18   Ht 1.626 m (5' 4\")   Wt 84 kg (185 lb 3 oz)   LMP 2024   SpO2 98%   BMI 31.79 kg/m²    Fetal Monitoring      Baseline FHR: 130 per minute  Variability: moderate  Accelerations: yes  Decelerations: none  TOCO: 4 in 10 min    Cervical Exam:  /-1    Membrane Status:  (leaking)  Rupture Date: 25  Rupture Time: 210 (Simultaneous filing. User may not have seen previous data.)  Fluid Color: Clear    Pitocin is at 8 nga-units/min  "

## 2025-02-21 NOTE — SIGNIFICANT EVENT
Called t evaluate second degree posterior vaginal laceration with extension up the labia minora bilaterally. Extension to the perineum of approximately 5mm. Rectal exam prior and after repair revealed no defects. Also of note interrupted figure of 8 sutures noted on the right labial extension hd been placed for hemostasis prior to my evaluation. 3-0 vicryl used to repair the vaginal and bilateral labial lacerations without difficulty and good hemostasis was achieved. Prominent rectocele noted which caused deep dips in the normal architecture of the vaginal wall bilaterally and was explored to ensure no sulcal tears.

## 2025-02-21 NOTE — ANESTHESIA PROCEDURE NOTES
Epidural Block    Patient location during procedure: OB  Start time: 2/20/2025 8:00 PM  End time: 2/20/2025 8:15 PM  Reason for block: labor analgesia  Staffing  Performed: ALICIA   Authorized by: Ousmane Sidhu DO    Performed by: ALICIA Haynes    Preanesthetic Checklist  Completed: patient identified, IV checked, risks and benefits discussed, surgical consent, pre-op evaluation, timeout performed and sterile techniques followed  Block Timeout  RN/Licensed healthcare professional reads aloud to the Anesthesia provider and entire team: Patient identity, procedure with side and site, patient position, and as applicable the availability of implants/special equipment/special requirements.  Patient on coagulant treatment: no  Timeout performed at: 2/20/2025 7:56 PM  Block Placement  Patient position: sitting  Sterility prep: cap, drape, mask and gloves  Sedation level: no sedation  Patient monitoring: blood pressure, continuous pulse oximetry and heart rate  Approach: midline  Local numbing: lidocaine 1% to skin and subcutaneous tissues  Vertebral space: lumbar  Lumbar location: L3-L4  Epidural  Loss of resistance technique: saline  Guidance: landmark technique        Needle  Needle type: Tuohy   Needle gauge: 17  Needle length: 10.2 cm  Needle insertion depth: 6.5 cm  Catheter type: multi-orifice  Catheter size: 19 G  Catheter at skin depth: 10.5 cm  Catheter securement method: clear occlusive dressing and liquid medical adhesive    Test dose: lidocaine 1.5% with epinephrine 1-to-200,000  Test dose: lidocaine 1.5% with epinephrine 1-to-200,000  Test dose result: no positive test dose    PCEA  Medication concentration used: 0.2% Ropivacaine with 2 mcg/mL Fentanyl  Dose (mL): 5  Lockout (minutes): 30  1-Hour Limit (boluses/hr): 2  Basal Rate: 10        Assessment  Sensory level: T10 bilateral  Block outcome: patient comfortable  Number of attempts: 2  Events: no positive test dose  Procedure assessment: patient  tolerated procedure well with no immediate complications

## 2025-02-21 NOTE — PROGRESS NOTES
"Assessment    29 y.o.  at 39w2d admitted for eIOL  FHT Category 1  Latent labor    Plan    #IOL  Maternal repositioning to left lateral with peanut ball  IV fluid bolus given  Continue pitocin per protocol  AROM for clear fluid  Continue assessment of maternal and fetal wellbeing  Recheck as clinically indicated by maternal or fetal status  Anticipate active phase of labor    Tracey Acosta, APRN-CNM, APRN-CNP    Subjective:  Ana Cruz is a 29 y.o.  at 39w2d admitted for eIOL. Comfortable after epidural    Objective:  /61   Pulse (!) 129   Temp 36.8 °C (98.2 °F) (Temporal)   Resp 18   Ht 1.626 m (5' 4\")   Wt 84 kg (185 lb 3 oz)   LMP 2024   SpO2 100%   BMI 31.79 kg/m²    Fetal Monitoring      Baseline FHR: 145 per minute  Variability: moderate  Accelerations: yes  Decelerations: none  TOCO: Contraction Frequency: 2-4 in 10 min    Cervical Exam:  50/-2    Membrane Status: AROM  Rupture Date: 25  Rupture Time:  (Simultaneous filing. User may not have seen previous data.)  Fluid Color: Clear    Pitocin is at 14 nga-units/min.  "

## 2025-02-21 NOTE — L&D DELIVERY NOTE
OB Delivery Note  2025  Ana Anthony  29 y.o.   Vaginal, Spontaneous     Gestational Age: 39w3d  /Para:   Quantitative Blood Loss: Admission to Discharge: 425 mL (2025  1:44 PM - 2025  6:53 AM)    MegaJennifer tapia [[id number]]      Labor Events    Sac identifier: Sac 1  Rupture date/time: 2025  Rupture type: Artificial  Fluid color: Clear  Fluid odor: None  Labor type: Induced Onset of Labor  Labor allowed to proceed with plans for an attempted vaginal birth?: Yes  Induction: Oxytocin, AROM  First cervical ripening date/time: 2025  Induction date/time: 2025  Induction indications: Risk Reducing  Complications: None       Labor Event Times    Labor onset date/time: 2025  Dilation complete date/time: 2025  Start pushing date/time: 2025       Labor Length    1st stage: 10h 00m  2nd stage: 0h 30m  3rd stage: 0h 05m       Placenta    Placenta delivery date/time: 2025  Placenta removal: Spontaneous  Placenta appearance: Intact  Placenta disposition: discarded       Cord    Vessels: 3 vessels  Complications: Nuchal  Nuchal intervention: reduced  Nuchal cord description: loose nuchal cord  Number of loops: 1  Delayed cord clamping?: Yes  Cord clamped date/time: 2025 02:02:00  Cord blood disposition: Lab  Gases sent?: No  Stem cell collection (by provider): No       Lacerations    Episiotomy: None  Perineal laceration: 2nd  Perineal laceration repaired?: Yes  Labial laceration?: Yes  Labial laceration location: bilateral  Labial laceration repaired?: Yes  Repair suture: 3-0 Synthetic Suture       Anesthesia    Method: Epidural       Operative Delivery    Forceps attempted?: No  Vacuum extractor attempted?: No       Shoulder Dystocia    Shoulder dystocia present?: No        Delivery    Time head delivered: 2025 02:00:00  Birth date/time: 2025 02:00:00  Delivery type: Vaginal,  Spontaneous  Complications: None       Resuscitation    Method: Suctioning       Apgars    Living status: Living  Apgar Component Scores:  1 min.:  5 min.:  10 min.:  15 min.:  20 min.:    Skin color:  1  1       Heart rate:  2  2       Reflex irritability:  2  2       Muscle tone:  2  2       Respiratory effort:  2  2       Total:  9  9       Apgars assigned by: SOFIA TEAGUE       Delivery Providers    Delivering clinician: Tracey Acosta, ABRAM-CNM, APRN-CNP   Provider Role    Cristela Leal, RN Delivery Nurse    Sofia Teague, RN Nursery Nurse     Resident    Elijah Mancini, RN Delivery Nurse    Nette Hanna MD On Call Attending Physician             Delivery Details:  Ana Cruz, a 29 y.o.  female delivered a viable Male infant with Apgars of 9  and 9 . Patient was fully dilated and pushing after 10 hours 0 minutes in labor. The patient was put in the dorsal lithotomy position and put on her side. Fetal descent noted throughout pushing efforts. Hands-on perineum at  with controlled extension of fetal head. Shoulders delivered swiftly following restitution. Delivery was via Vaginal, Spontaneous to a sterile field under Epidural anesthesia. Infant delivered in Vertex Left Occiput Anterior position. Postpartum pitocin bolus initiated. Vigorous infant placed on maternal abdomen for skin to skin. Cord cut by FOB after delayed cord clamping. Placenta delivered spontaneously and in tact with gentle traction on umbilical cord. Cord blood was obtained in routine fashion. Cord complications were: none. Intact placenta delivered at 2025  2:05 AM. Fundal massage performed. Fundus palpated firm, midline, and at umbilicus. Perineum, vagina, cervix were inspected, and the following lacerations were noted:   Jennifer Cruz [[id number]]      Lacerations    Episiotomy: None  Perineal laceration: 2nd  Perineal laceration repaired?: Yes  Labial laceration?: Yes  Labial laceration location: bilateral  Labial  laceration repaired?: Yes  Repair suture: 3-0 Synthetic Suture          Any lacerations were repaired in the usual fashion using 3-0 Synthetic Suture by Dr. Hanna. Prominent rectocele noted. Excellent hemostasis was noted. Needle count correct. Infant and patient in delivery room in good and stable condition.     Tracey Acosta, APRN-CNM, APRN-CNP

## 2025-02-21 NOTE — CARE PLAN
The patient's goals for the shift include safe delivery, no tears    The clinical goals for the shift include postpartum bleeding remains stable    Over the shift, the patient did not make progress toward the following goals. Barriers to progression include none. Recommendations to address these barriers include none.

## 2025-02-21 NOTE — ANESTHESIA PREPROCEDURE EVALUATION
Patient: Ana Cruz    Evaluation Method: In-person visit    Procedure Information    Date: 02/20/25  Procedure: Labor Consult         Relevant Problems   ID   (+) Tinea      GYN   (+) 38 weeks gestation of pregnancy ([facility])       Clinical information reviewed:   Tobacco  Allergies  Meds   Med Hx  Surg Hx   Fam Hx  Soc Hx        NPO Detail:  No data recorded     OB/Gyn Evaluation    Present Pregnancy    Patient is pregnant now.   Obstetric History                Physical Exam    Airway  Mallampati: II     Cardiovascular - normal exam     Dental - normal exam     Pulmonary - normal exam     Abdominal            Anesthesia Plan    History of general anesthesia?: no  History of complications of general anesthesia?: no    ASA 2     epidural

## 2025-02-21 NOTE — PROGRESS NOTES
Intrapartum Progress Note    Assessment/Plan   Ana Cruz is a 29 y.o.  at 39w2d. TACO: 2025, by Last Menstrual Period admitted for elective IOL.     #IOL  Encourage ambulation as tolerated  Maternal repositioning  Continue pitocin per protocol  Pain management per patient request  Discussed with patient AROM once comfortable with epidural, amendable to this plan of care  Continue assessment of maternal and fetal wellbeing  Recheck as clinically indicated by maternal or fetal status  Anticipate active phase of labor    Tracey Acosta, REINALDO, APRN-CNP   Assessment & Plan  Encounter for induction of labor    Pregnancy Problems (from 24 to present)       Problem Noted Diagnosed Resolved    Encounter for induction of labor 2025 by REINALDO Freeman  No    Priority:  Medium       38 weeks gestation of pregnancy ([facility]) 2024 by REINALDO Freeman  No    Priority:  Medium       Overview Addendum 2/3/2025  4:41 PM by REINALDO Freeman     Desired provider in labor: [x] CNM  [] Physician  [x] Blood Products: [x] Yes, accepts [] No, needs counseling  [x] Initial BMI: 27.28   [x] Prenatal Labs: WNL  [x] Cervical Cancer Screening up to date  [x] Rh status:  O POS  [x] Genetic Screening:  WNL, BOY  [x] NT US: (11-13 wks)  [x] Baby ASA (if indicated): NI  [x] Pregnancy dated by: LMP    [x] Anatomy US: (19-20 wks)  [x] Federal Sterilization consent signed (if indicated): NI  [x] 1hr GCT at 24-28wks: WNL  [x] Rhogam (if indicated): NI   [x] Fetal Surveillance (if indicated): NI  [x] Tdap (27-32 wks, may be given up to 36 wks if initial window missed):   [x] RSV (32-36 wks) (Sept. to end of ):   [x] Flu Vaccine: patient reported.   [] Breastfeeding:  [] Postpartum Birth control method:   [x] GBS at 36 - 37 wks:  [x] 39 weeks discussion of IOL vs. Expectant management: desires IOL at 39 weeks.   [x] Mode of delivery ( anticipated ): Vaginal            Hx of maternal laceration,  "3rd degree, currently pregnant ([facility]) 2024 by REINALDO Freeman  No    Priority:  Medium       Overview Signed 2024  6:24 PM by REINALDO Freeman     Patient is interested in potential induction at 39 wks.                Subjective   Ana Cruz is a 29 y.o.  at 39w2d admitted for elective IOL. Assumed care of patient at 1900 from SAULO Stewart. Pt feeling more uncomfortable with contractions, thinking about epidural and requesting SVE.    Objective   /68   Pulse 108   Temp 36.8 °C (98.2 °F) (Temporal)   Resp 18   Ht 1.626 m (5' 4\")   Wt 84 kg (185 lb 3 oz)   LMP 2024   SpO2 100%   BMI 31.79 kg/m²      Intake/Output:  No intake or output data in the 24 hours ending 25    Physical Examination:  GENERAL: Examination reveals a well developed, well nourished, gravid female in no acute distress. She is alert and cooperative.  ABDOMEN:  gravid, soft, NT  FHR is 150bpm, with Accelerations, no decels, and a cat 1 tracing.    [place] reading:    The fetus is in a vertex presentation, determined by  BSUS on admission by SAULO Stewart  Current Estimated Fetal Weight 7.5#3 established by Leopold's maneuver  CERVIX:  4/50/-2 ; MEMBRANES are Intact  EXTREMITIES: no redness or tenderness in the calves or thighs, no edema  SKIN: normal coloration and turgor, no rashes  PSYCHOLOGICAL: awake and alert; oriented to person, place, and time    Chaperone Present: Yes.  Chaperone Name/Title: KENNEDI Leal  Examination Chaperoned:  SVE    Lab Review:  Labs in chart were reviewed.  "

## 2025-02-22 PROCEDURE — 2500000005 HC RX 250 GENERAL PHARMACY W/O HCPCS: Performed by: ADVANCED PRACTICE MIDWIFE

## 2025-02-22 PROCEDURE — RXMED WILLOW AMBULATORY MEDICATION CHARGE

## 2025-02-22 PROCEDURE — 2500000001 HC RX 250 WO HCPCS SELF ADMINISTERED DRUGS (ALT 637 FOR MEDICARE OP): Performed by: ADVANCED PRACTICE MIDWIFE

## 2025-02-22 PROCEDURE — 2500000004 HC RX 250 GENERAL PHARMACY W/ HCPCS (ALT 636 FOR OP/ED): Performed by: ADVANCED PRACTICE MIDWIFE

## 2025-02-22 PROCEDURE — 1100000001 HC PRIVATE ROOM DAILY

## 2025-02-22 RX ORDER — ACETAMINOPHEN 325 MG/1
650 TABLET ORAL EVERY 6 HOURS PRN
Qty: 120 TABLET | Refills: 0 | Status: SHIPPED | OUTPATIENT
Start: 2025-02-22

## 2025-02-22 RX ORDER — POLYETHYLENE GLYCOL 3350 17 G/17G
17 POWDER, FOR SOLUTION ORAL 2 TIMES DAILY PRN
Qty: 14 EACH | Refills: 0 | Status: SHIPPED | OUTPATIENT
Start: 2025-02-22

## 2025-02-22 RX ORDER — IBUPROFEN 600 MG/1
600 TABLET ORAL EVERY 6 HOURS
Qty: 120 TABLET | Refills: 0 | Status: SHIPPED | OUTPATIENT
Start: 2025-02-22

## 2025-02-22 RX ADMIN — BENZOCAINE AND LEVOMENTHOL 1 APPLICATION: 200; 5 SPRAY TOPICAL at 20:44

## 2025-02-22 RX ADMIN — ACETAMINOPHEN 975 MG: 325 TABLET ORAL at 10:54

## 2025-02-22 RX ADMIN — WITCH HAZEL 1 EACH: 500 SOLUTION RECTAL; TOPICAL at 20:44

## 2025-02-22 RX ADMIN — IBUPROFEN 600 MG: 600 TABLET, FILM COATED ORAL at 16:54

## 2025-02-22 RX ADMIN — ENOXAPARIN SODIUM 40 MG: 100 INJECTION SUBCUTANEOUS at 16:54

## 2025-02-22 RX ADMIN — ACETAMINOPHEN 975 MG: 325 TABLET ORAL at 04:25

## 2025-02-22 RX ADMIN — ACETAMINOPHEN 975 MG: 325 TABLET ORAL at 22:14

## 2025-02-22 RX ADMIN — IBUPROFEN 600 MG: 600 TABLET, FILM COATED ORAL at 10:54

## 2025-02-22 RX ADMIN — IBUPROFEN 600 MG: 600 TABLET, FILM COATED ORAL at 22:14

## 2025-02-22 RX ADMIN — IBUPROFEN 600 MG: 600 TABLET, FILM COATED ORAL at 04:25

## 2025-02-22 RX ADMIN — ACETAMINOPHEN 975 MG: 325 TABLET ORAL at 16:54

## 2025-02-22 ASSESSMENT — PAIN SCALES - GENERAL
PAINLEVEL_OUTOF10: 0 - NO PAIN

## 2025-02-22 NOTE — CARE PLAN
The patient's goals for the shift include Rest and bond with baby    The clinical goals for the shift include Adequate pain control, normal VS (BP<160/110), normal transition to post partum    Problem: Pain - Adult  Goal: Verbalizes/displays adequate comfort level or baseline comfort level  Outcome: Progressing     Problem: Safety - Adult  Goal: Free from fall injury  Outcome: Progressing     Problem: Postpartum  Goal: Experiences normal postpartum course  Outcome: Progressing  Goal: Appropriate maternal -  bonding  Outcome: Progressing  Goal: Establish and maintain infant feeding pattern for adequate nutrition  Outcome: Progressing  Goal: No s/sx infection  Outcome: Progressing  Goal: No s/sx of hemorrhage  Outcome: Progressing  Goal: Minimal s/sx of HDP and BP<160/110  Outcome: Progressing    VS stable, voiding, pain well controlled, minimal bleeding/swelling

## 2025-02-22 NOTE — PROGRESS NOTES
Postpartum Progress Note    Assessment/Plan   Ana Cruz is a 29 y.o., , who delivered at 39w3d gestation and is now postpartum day 1.    PPD#1 s/p   -meeting all milestones  -pain well controlled  -h/o 3rd degree with current 2nd degree w/ repair plans to follow up with PFPT postpartum  -mild anemia   -DVT risk score 5 for lovenox     Feeding  -formula    Contraception  -none    Discharge planning  -plan for discharge tomorrow per patient request-   -Baby boy getting circumcision presently     Evi VALVERDE CNM      Assessment & Plan  Normal vaginal delivery ([facility])    Anemia during pregnancy in third trimester ([facility])    Pregnancy Problems (from 24 to present)       Problem Noted Diagnosed Resolved    Normal vaginal delivery ([facility]) 2025 by REINALDO Freeman  No    Priority:  Medium       Anemia during pregnancy in third trimester ([facility]) 2025 by REINALDO Arshad, APRN-CNP  No    Priority:  Medium       Overview Signed 2025  8:02 PM by REINALDO Arshad, APRN-CNP     Admit hgb 10.9 --> will order po iron after delivery         38 weeks gestation of pregnancy ([facility]) 2024 by REINALDO Freeman  No    Priority:  Medium       Overview Addendum 2/3/2025  4:41 PM by REINALDO Freeman     Desired provider in labor: [x] CNM  [] Physician  [x] Blood Products: [x] Yes, accepts [] No, needs counseling  [x] Initial BMI: 27.28   [x] Prenatal Labs: WNL  [x] Cervical Cancer Screening up to date  [x] Rh status:  O POS  [x] Genetic Screening:  WNL, BOY  [x] NT US: (11-13 wks)  [x] Baby ASA (if indicated): NI  [x] Pregnancy dated by: LMP    [x] Anatomy US: (19-20 wks)  [x] Federal Sterilization consent signed (if indicated): NI  [x] 1hr GCT at 24-28wks: WNL  [x] Rhogam (if indicated): NI   [x] Fetal Surveillance (if indicated): NI  [x] Tdap (27-32 wks, may be given up to 36 wks if initial window missed):   [x] RSV (32-36 wks) (Sept. to end  of Jan):   [x] Flu Vaccine: patient reported.   [] Breastfeeding:  [] Postpartum Birth control method:   [x] GBS at 36 - 37 wks:  [x] 39 weeks discussion of IOL vs. Expectant management: desires IOL at 39 weeks.   [x] Mode of delivery ( anticipated ): Vaginal            Hx of maternal laceration, 3rd degree, currently pregnant ([facility]) 7/17/2024 by REINALDO Freeman  No    Priority:  Medium       Overview Signed 7/17/2024  6:24 PM by REINALDO Freeman     Patient is interested in potential induction at 39 wks.                Hospital course: no complications  Vaginal Birth  Patient is not breastfeedingThe patient's blood type is O POS. The baby's blood type is A POS. Rhogam is not indicated.    Subjective   Her pain is well controlled with current medications  She is passing flatus  She is ambulating well  She is tolerating a Adult diet Regular  She reports no breast or nursing problems and formula only  She denies emotional concerns today   Her plan for contraception is none         Objective   Allergies:   Patient has no known allergies.         Last Vitals:  Temp Pulse Resp BP MAP Pulse Ox   36.5 °C (97.7 °F) 101 15 111/71 84 96 %     Vitals Min/Max Last 24 Hours:  Temp  Min: 36.5 °C (97.7 °F)  Max: 37.4 °C (99.3 °F)  Pulse  Min: 79  Max: 101  Resp  Min: 15  Max: 16  BP  Min: 104/63  Max: 127/81  MAP (mmHg)  Min: 77  Max: 96    Intake/Output:   No intake or output data in the 24 hours ending 02/22/25 1155    Physical Exam:  General: Examination reveals a well developed, well nourished, female, in no acute distress. She is alert and cooperative.  Lungs: RR even unlabored.  Abdomen: soft, nontender .  Fundus: firm and at umbilicus.  Perineum: well approximated, well healing, and mild edema slight tender to touch.  Extremities: no redness or tenderness in the calves or thighs, no edema.  Psychological: awake and alert; oriented to person, place, and time.      Lab Data:  Lab Results   Component Value  Date    WBC 9.8 02/20/2025    HGB 10.9 (L) 02/20/2025    HCT 35.0 (L) 02/20/2025     02/20/2025

## 2025-02-22 NOTE — ANESTHESIA POSTPROCEDURE EVALUATION
"Patient: Ana Cruz    Procedure Summary       Date: 25 Room / Location:     Anesthesia Start:  Anesthesia Stop: 25    Procedure: Labor Analgesia Diagnosis:     Scheduled Providers:  Responsible Provider: Ousmane Sidhu DO    Anesthesia Type: epidural ASA Status: 2            Anesthesia Type: epidural    Visit Vitals  /73   Pulse 79   Temp 36.9 °C (98.4 °F) (Temporal)   Resp 16   Ht 1.626 m (5' 4\")   Wt 84 kg (185 lb 3 oz)   LMP 2024   SpO2 97%   Breastfeeding No   BMI 31.79 kg/m²   OB Status Recent pregnancy   Smoking Status Never   BSA 1.95 m²           Anesthesia Post Evaluation    Patient location during evaluation: floor  Patient participation: complete - patient participated  Level of consciousness: awake and alert  Pain management: adequate  Airway patency: patent  Cardiovascular status: acceptable and hemodynamically stable  Respiratory status: acceptable  Hydration status: acceptable  Postoperative Nausea and Vomiting: none  Comments: Ana Cruz is a 29 y.o., , who had a Vaginal, Spontaneous delivery on 2025 at 39w3d and is now POD1.    She had Neuraxial Anesthesia without immediate complications noted.       Pain well controlled    ---------------------------               25                      0332         ---------------------------   BP:          110/73         Pulse:         79           Resp:          16           Temp:   36.9 °C (98.4 °F)   SpO2:          97%         ---------------------------    Neuraxial site assessed. No visible redness or swelling or drainage. Patient able to ambulate and move all extremities without difficulty. Able to void. No complaints of nausea/vomiting. Tolerating PO intake well. No s/sx of PDPH.     Anesthesia will sign off     Jacob Carmen MD          No notable events documented.    "

## 2025-02-22 NOTE — CARE PLAN
The patient's goals for the shift include Rest and bond with baby    The clinical goals for the shift include vital signs within normal lmits    Over the shift, the patient did not make progress toward the following goals. Barriers to progression include none. Recommendations to address these barriers include none.

## 2025-02-23 ENCOUNTER — PHARMACY VISIT (OUTPATIENT)
Dept: PHARMACY | Facility: CLINIC | Age: 30
End: 2025-02-23
Payer: COMMERCIAL

## 2025-02-23 VITALS
BODY MASS INDEX: 31.62 KG/M2 | RESPIRATION RATE: 20 BRPM | WEIGHT: 185.19 LBS | HEART RATE: 66 BPM | DIASTOLIC BLOOD PRESSURE: 77 MMHG | OXYGEN SATURATION: 95 % | TEMPERATURE: 97.7 F | HEIGHT: 64 IN | SYSTOLIC BLOOD PRESSURE: 118 MMHG

## 2025-02-23 PROBLEM — Z3A.38 38 WEEKS GESTATION OF PREGNANCY (HHS-HCC): Status: RESOLVED | Noted: 2024-08-16 | Resolved: 2025-02-23

## 2025-02-23 PROBLEM — O09.299 HX OF MATERNAL LACERATION, 3RD DEGREE, CURRENTLY PREGNANT (HHS-HCC): Status: RESOLVED | Noted: 2024-07-17 | Resolved: 2025-02-23

## 2025-02-23 PROCEDURE — 2500000001 HC RX 250 WO HCPCS SELF ADMINISTERED DRUGS (ALT 637 FOR MEDICARE OP): Performed by: ADVANCED PRACTICE MIDWIFE

## 2025-02-23 RX ADMIN — ACETAMINOPHEN 975 MG: 325 TABLET ORAL at 10:48

## 2025-02-23 RX ADMIN — IBUPROFEN 600 MG: 600 TABLET, FILM COATED ORAL at 04:42

## 2025-02-23 RX ADMIN — ACETAMINOPHEN 975 MG: 325 TABLET ORAL at 04:42

## 2025-02-23 RX ADMIN — IBUPROFEN 600 MG: 600 TABLET, FILM COATED ORAL at 10:49

## 2025-02-23 ASSESSMENT — PAIN DESCRIPTION - LOCATION: LOCATION: BACK

## 2025-02-23 ASSESSMENT — PAIN SCALES - GENERAL
PAINLEVEL_OUTOF10: 0 - NO PAIN
PAINLEVEL_OUTOF10: 3
PAINLEVEL_OUTOF10: 1

## 2025-02-23 NOTE — CARE PLAN
The patient's goals for the shift include Rest and bond with baby    The clinical goals for the shift include stable vital signs    Over the shift,vss and assessment wnl. Cleared for discharge

## 2025-02-23 NOTE — DISCHARGE SUMMARY
Discharge Summary    Admission Date: 2/20/2025  Discharge Date: 02/23/25     Discharge Diagnosis  Normal vaginal delivery ([facility])    Hospital Course  Delivery Date: 2/21/2025 2:00 AM  Delivery type: Vaginal, Spontaneous   GA at delivery: 39w3d  Outcome: Living  Anesthesia during delivery: Epidural  Intrapartum complications: None  Feeding method: Breastfeeding Status: No     Procedures: none  Contraception at discharge: desires interval COCs    Pertinent Physical Exam At Time of Discharge  Patient doing well overall, denies concerns today. No acute events overnight. Ambulating and urinating without difficulty. Denies chest pain, shortness of breath, leg pain or swelling, headaches, vision changes, heavy vaginal bleeding, abdominal pain, dizziness, fatigue.     General: Examination reveals a well developed, well nourished, female, in no acute distress. She is alert and cooperative.  Lungs: normal respiratory effort.  Fundus: firm, below umbilicus, and nontender.  Extremities: no redness or tenderness in the calves or thighs, no edema.  Psychological: awake and alert; oriented to person, place, and time.    Last Vitals:  Temp Pulse Resp BP MAP Pulse Ox   36.5 °C (97.7 °F) 66 20 118/77 90 95 %     Discharge Meds     Your medication list        START taking these medications        Instructions Last Dose Given Next Dose Due   acetaminophen 325 mg tablet  Commonly known as: Tylenol      Take 2 tablets (650 mg) by mouth every 6 hours if needed for mild pain (1 - 3).       ibuprofen 600 mg tablet      Take 1 tablet (600 mg) by mouth every 6 hours.       polyethylene glycol 17 gram packet  Commonly known as: Glycolax, Miralax      Take 17 g by mouth 2 times a day as needed (first line).              CONTINUE taking these medications        Instructions Last Dose Given Next Dose Due   cyclobenzaprine 10 mg tablet  Commonly known as: Flexeril      Take 0.5 tablets (5 mg) by mouth 3 times a day for 10 days.       Prenatal  Vitamin 27 mg iron- 0.8 mg tablet  Generic drug: prenatal vitamin (iron-folic)                     Where to Get Your Medications        These medications were sent to [facility]  [address], [address], [place] [id number]      Hours: 8AM to 6PM Mon-Fri, 8AM to 4PM Sat, 9AM to 1PM Sun Phone: [phone]   acetaminophen 325 mg tablet  ibuprofen 600 mg tablet  polyethylene glycol 17 gram packet          Complications Requiring Follow-Up  2nd perineal laceration, rectocele; h/o 3 degree laceration - referral to pelvic floor PT and ERAD clinic placed    Test Results Pending At Discharge  Pending Labs       No current pending labs.            Outpatient Follow-Up  No future appointments.    I spent >30 minutes in the professional and overall care of this patient.      Cheyenne Hightower, APRN-CNM, APRN-CNP

## 2025-02-23 NOTE — CARE PLAN
Problem: Pain - Adult  Goal: Verbalizes/displays adequate comfort level or baseline comfort level  Outcome: Progressing     Problem: Safety - Adult  Goal: Free from fall injury  Outcome: Progressing     Problem: Postpartum  Goal: Experiences normal postpartum course  Outcome: Progressing  Goal: Appropriate maternal -  bonding  Outcome: Progressing  Goal: Establish and maintain infant feeding pattern for adequate nutrition  Outcome: Progressing  Goal: No s/sx infection  Outcome: Progressing  Goal: No s/sx of hemorrhage  Outcome: Progressing  Goal: Minimal s/sx of HDP and BP<160/110  Outcome: Progressing       Vital signs stable, voiding, lochia scant, pain well controled with PO meds, bonding well with infant

## 2025-02-26 ENCOUNTER — APPOINTMENT (OUTPATIENT)
Dept: OBSTETRICS AND GYNECOLOGY | Facility: CLINIC | Age: 30
End: 2025-02-26
Payer: COMMERCIAL

## 2025-03-14 NOTE — PROGRESS NOTES
"[facility] Initial Patient Visit    Ana Cruz is a 29 y.o. year old , referred by Dr. Hanna for a 2nd degree perineal laceration and note of prominent rectocele following vaginal delivery of baby boy on date 25.     Delivery details:   - Gestational age: 29w5d  - Delivery type:   - Infant weight: 3.62 kg  - Laceration type: second degree     Delivery/Procedure note reviewed:   Any lacerations were repaired in the usual fashion using 3-0 Synthetic Suture by Dr. Hanna. Prominent rectocele noted. Excellent hemostasis was noted. Needle count correct. Infant and patient in delivery room in good and stable condition.     Postpartum recovery:    Was told at delivery she had a rectocele but has not had any symptoms.   - Vaginal pain? Just a little sore, in certain positions  - Rectal pain? no  - For pain: nothing  - For bowels: nothing  - Urinary incontinence? No  - Bowel leakage? No  - Returned to intercourse? No  - Formula/breast feeding Formula feeding  - Mood: doing well, EPDS 0    PHYSICAL EXAMINATION:  Patient's last menstrual period was 2024.  Body mass index is 27.98 kg/m².  Ht 1.626 m (5' 4\")   Wt 73.9 kg (163 lb)   LMP 2024   BMI 27.98 kg/m²     General Appearance: well appearing  Neuro: Alert and oriented   HEENT: mucous membranes moist, neck supple  Resp: No respiratory distress, normal work of breathing  MSK: normal range of motion, gait appropriate      Pelvic:  Chaperone for pelvic exam:   Genitourinary:  normal external genitalia, [place]th[place]'s glands, [place]'s glands negative,   Urethra normal meatus, non-tender, no periurethral mass  Vaginal mucosa  normal  Cervix  not assessed  Uterus not assessed  Adnexae not assessed    Perineum: well healed laceration, well approximated, no evidence of suture bridging, no fistulous connection noted    CST negative    POP-Q (in supine position):       Aa -2     Ba -2     C               gh 5     pb 3     tvl               Ap -1     " Bp -1     D     Rectal: no hemorrhoids, fissures or masses.     PVR (by ultrasound):       IMPRESSION AND PLAN:  Ana Cruz is a 29 y.o. year old, here for evaluation for second degree laceration (prior third degree laceration in previous delivery) and rectocele noted at time of delivery    Concern for rectocele  - exam today without significant rectocele  - discussed pathophysiology of rectocele and that they often spontaneously resolve after delivery  - already has PFPT referral and plans to follow up with them   - advised to RTC if any concerns at PPV or as she starts PFPT    All questions and concerns were answered and addressed.  The patient expressed understanding and agrees with the plan.     Amanda Handley MD

## 2025-03-17 ENCOUNTER — APPOINTMENT (OUTPATIENT)
Dept: OBSTETRICS AND GYNECOLOGY | Facility: CLINIC | Age: 30
End: 2025-03-17
Payer: COMMERCIAL

## 2025-03-17 VITALS — WEIGHT: 163 LBS | HEIGHT: 64 IN | BODY MASS INDEX: 27.83 KG/M2

## 2025-03-17 DIAGNOSIS — R10.2 PELVIC PAIN IN FEMALE: ICD-10-CM

## 2025-03-17 PROCEDURE — 3008F BODY MASS INDEX DOCD: CPT | Performed by: STUDENT IN AN ORGANIZED HEALTH CARE EDUCATION/TRAINING PROGRAM

## 2025-03-17 PROCEDURE — 99214 OFFICE O/P EST MOD 30 MIN: CPT | Performed by: STUDENT IN AN ORGANIZED HEALTH CARE EDUCATION/TRAINING PROGRAM

## 2025-03-17 ASSESSMENT — PAIN SCALES - GENERAL: PAINLEVEL_OUTOF10: 2

## 2025-03-25 ENCOUNTER — APPOINTMENT (OUTPATIENT)
Dept: OBSTETRICS AND GYNECOLOGY | Facility: CLINIC | Age: 30
End: 2025-03-25
Payer: COMMERCIAL

## 2025-03-25 ENCOUNTER — EVALUATION (OUTPATIENT)
Dept: PHYSICAL THERAPY | Facility: CLINIC | Age: 30
End: 2025-03-25
Payer: COMMERCIAL

## 2025-03-25 PROCEDURE — 97110 THERAPEUTIC EXERCISES: CPT | Mod: GP | Performed by: PHYSICAL THERAPIST

## 2025-03-25 PROCEDURE — 97161 PT EVAL LOW COMPLEX 20 MIN: CPT | Mod: GP | Performed by: PHYSICAL THERAPIST

## 2025-03-25 PROCEDURE — 97530 THERAPEUTIC ACTIVITIES: CPT | Mod: GP | Performed by: PHYSICAL THERAPIST

## 2025-03-25 NOTE — PROGRESS NOTES
Physical Therapy    PELVIC FLOOR EVALUATION AND TREATMENT    Name: Ana Cruz  MRN: [id number]  : 1995  Today's Date: 25     Time Calculation  Start Time: 1110  Stop Time: 1200  Time Calculation (min): 50 min    PT Evaluation Time Entry  PT Evaluation (Low) Time Entry: 25  PT Therapeutic Procedures Time Entry  Therapeutic Exercise Time Entry: 15  Therapeutic Activity Time Entry: 10         Assessment:   Pt presents about 4 weeks PP following the vaginal birth of her second son. Pt does report second degree tearing and overall is feeling good. She has not noted any urinary symptoms and no difficulty with BM. She does endorse mild perineal soreness as well as abdominal weakness and mild separation. Pt does demonstrate mild DR, which was addressed at this visit with starting core progression. She will benefit from skilled therapy to address current impairments and full return to regular activity         Plan:   Treatment/Interventions: Cryotherapy, Dry needling, Education/ Instruction, Electrical stimulation, Hot pack, Manual therapy, Neuromuscular re-education, Self care/ home management, Therapeutic activities, Therapeutic exercises  PT Plan: Skilled PT  PT Frequency: 1 time per week  Duration: 4-6 weeks  Onset Date: 25  Rehab Potential: Excellent  Plan of Care Agreement: Patient      Current Problem:  1. Postpartum care and examination ([facility])  Referral to Physical Therapy    Follow Up In Physical Therapy          Subjective   General  Reason for Referral: Postpartum care; return to regular activity, DR  Referred By: Cheyenne Hightower CNM, CNP  Preferred Learning Style: verbal, visual, written  Precautions  STEADI Fall Risk Score (The score of 4 or more indicates an increased risk of falling): 0  Precautions Comment: None         Objective   PELVIC HISTORY:    Chief Complaint/Description of Symptoms:   Pt presents to clinic PP with the birth of her son on 25    HPI:   Pt is about 4  weeks PP and is overall feeling pretty good  It was noted after delivery that she had a rectocele but reports no symptoms and with her PP check in with Dr. Handley, no rectocele was noted   Pt did suffer 2nd degree tearing with this delivery; she is still noting some soreness at the perineal area, but no significant pain   She denies any urinary symptoms, BM are feeling normal and she denies constipation     She does note some small abdominal separation, which she does think she noted after her first pregnancy as well     Home Environment/Social Factors/Occupation:     Vaginal births with both sons     Not cleared for internal examination yet per OBGYN; will wait an additional 2 weeks per Dr. Handley       PELVIC PAIN:     Some perineal soreness noted     Since onset, the symptoms are: Improving overall   Pain when emptying bladder: No   Pain with wiping or tight clothing: Some soreness; has been utilizing nima bottle still   Pain with intercourse: not cleared; no hx of pain with IC   History of back pain:     EXERCISE:  Do you do Kegels? No    Current exercise regime:     BLADDER:     Excessive Urinary Urgency: No  Daytime Voiding Frequency: WNL  Nighttime Voiding Frequency: No   Unintentional urine loss frequency: None   Leakage occurs with: N/A   Leakage amount: N/A  Difficulty initiating flow of urine: No  Slow/weak urine stream: No  Difficulty starting urine stream/push to urinate: No  Able to completely empty bladder: Yes   Tests performed by doctor: Dr. Handley follow up for [facility] clinic   Frequent UTI's: No     BOWEL:     Excessive Bowel Urgency: No   BM Frequency: every day, every other day   Frequent Diarrhea: No   Frequent constipation/straining/incomplete emptying: no significant constipation, straining   [place] Stool Scale rating: Type 4    Unintentional stool loss: No       POSTURE:   Mild rounded shoulder posturing  Mild increase in lumbar lordosis       ROM R/L:  Hip flex: WNL bilat  Hip IR: WNL  bilat  Hip ER: WNL bilat    No pain with ROM     SPECIAL TESTING:   (+)  Single finger width just proximal to umbilicus and distal to umbilicus   Good TA contraction, activation   Improved contraction with exhalation     OUTCOMES MEASURE:  Female NIH Chronic Prostatitis Symptom index: 7    TREATMENT  Therapeutic activity  Review of perineal massage to begin at 6 weeks for perineal soreness; external only at this point until performing internal examination  Review and instruction on return to running; just reminded pt to wait until at least 12 weeks PP before trying to incorporation jog/walk    Therapeutic exercise:  DB  TA with march  Bridge with breath   PPT with breath        Careplan Goals:  Problem: PT Problem       Goal: Pt will demonstrate appropriate coordination of diaphragm, core, pelvic floor for improved pressure management           Goal: Pt will continue to report no urinary leakage, difficulty with BM for full return to regular activity with appropriate bladder control           Goal: Pt will report no perineal soreness for ability to resume regular sexual activity without pain          Goal: Pt will be independent in HEP for home maintenance              Sedrick Morrison, PT

## 2025-03-26 ASSESSMENT — ENCOUNTER SYMPTOMS
OCCASIONAL FEELINGS OF UNSTEADINESS: 0
DEPRESSION: 0
LOSS OF SENSATION IN FEET: 0

## 2025-03-31 ENCOUNTER — POSTPARTUM VISIT (OUTPATIENT)
Dept: OBSTETRICS AND GYNECOLOGY | Facility: CLINIC | Age: 30
End: 2025-03-31
Payer: COMMERCIAL

## 2025-03-31 VITALS — BODY MASS INDEX: 27.81 KG/M2 | SYSTOLIC BLOOD PRESSURE: 106 MMHG | WEIGHT: 162 LBS | DIASTOLIC BLOOD PRESSURE: 64 MMHG

## 2025-03-31 DIAGNOSIS — Z30.011 ENCOUNTER FOR BCP (BIRTH CONTROL PILLS) INITIAL PRESCRIPTION: ICD-10-CM

## 2025-03-31 PROCEDURE — 0503F POSTPARTUM CARE VISIT: CPT

## 2025-03-31 RX ORDER — NORETHINDRONE ACETATE AND ETHINYL ESTRADIOL 1MG-20(24)
1 KIT ORAL DAILY
Qty: 28 TABLET | Refills: 12 | Status: SHIPPED | OUTPATIENT
Start: 2025-03-31 | End: 2026-03-31

## 2025-03-31 ASSESSMENT — ENCOUNTER SYMPTOMS
RESPIRATORY NEGATIVE: 0
ENDOCRINE NEGATIVE: 0
ALLERGIC/IMMUNOLOGIC NEGATIVE: 0
PSYCHIATRIC NEGATIVE: 0
EYES NEGATIVE: 0
HEMATOLOGIC/LYMPHATIC NEGATIVE: 0
CARDIOVASCULAR NEGATIVE: 0
CONSTITUTIONAL NEGATIVE: 0
GASTROINTESTINAL NEGATIVE: 0
MUSCULOSKELETAL NEGATIVE: 0
NEUROLOGICAL NEGATIVE: 0

## 2025-03-31 ASSESSMENT — EDINBURGH POSTNATAL DEPRESSION SCALE (EPDS)
THE THOUGHT OF HARMING MYSELF HAS OCCURRED TO ME: NEVER
I HAVE LOOKED FORWARD WITH ENJOYMENT TO THINGS: AS MUCH AS I EVER DID
I HAVE BEEN SO UNHAPPY THAT I HAVE BEEN CRYING: NO, NEVER
THINGS HAVE BEEN GETTING ON TOP OF ME: NO, I HAVE BEEN COPING AS WELL AS EVER
I HAVE FELT SAD OR MISERABLE: NO, NOT AT ALL
I HAVE BLAMED MYSELF UNNECESSARILY WHEN THINGS WENT WRONG: NO, NEVER
TOTAL SCORE: 0
I HAVE BEEN SO UNHAPPY THAT I HAVE HAD DIFFICULTY SLEEPING: NOT AT ALL
I HAVE BEEN ABLE TO LAUGH AND SEE THE FUNNY SIDE OF THINGS: AS MUCH AS I ALWAYS COULD
I HAVE FELT SCARED OR PANICKY FOR NO GOOD REASON: NO, NOT AT ALL
I HAVE BEEN ANXIOUS OR WORRIED FOR NO GOOD REASON: NO, NOT AT ALL

## 2025-03-31 NOTE — PROGRESS NOTES
Subjective   29 y.o.  presenting for postpartum follow-up     Delivery Date: 2025  Gestational Age: 39w3d   Type of Delivery: Vaginal, Spontaneous     Pregnancy Problems (from 24 to present)       Problem Noted Diagnosed Resolved    Normal vaginal delivery ([facility]) 2025 by REINALDO Freeman  No    Priority:  Medium       Anemia during pregnancy in third trimester ([facility]) 2025 by REINALDO Arshad, APRN-CNP  No    Overview Signed 2025  8:02 PM by REINALDO Arshad, APRN-CNP     Admit hgb 10.9 --> will order po iron after delivery         38 weeks gestation of pregnancy ([facility]) 2024 by REINALDO Freeman  2025 by REINALDO Justice, APRN-CNP    Priority:  Medium       Overview Addendum 2/3/2025  4:41 PM by REINALDO Freeman     Desired provider in labor: [x] CNM  [] Physician  [x] Blood Products: [x] Yes, accepts [] No, needs counseling  [x] Initial BMI: 27.28   [x] Prenatal Labs: WNL  [x] Cervical Cancer Screening up to date  [x] Rh status:  O POS  [x] Genetic Screening:  WNL, BOY  [x] NT US: (11-13 wks)  [x] Baby ASA (if indicated): NI  [x] Pregnancy dated by: LMP    [x] Anatomy US: (19-20 wks)  [x] Federal Sterilization consent signed (if indicated): NI  [x] 1hr GCT at 24-28wks: WNL  [x] Rhogam (if indicated): NI   [x] Fetal Surveillance (if indicated): NI  [x] Tdap (27-32 wks, may be given up to 36 wks if initial window missed):   [x] RSV (32-36 wks) (Sept. to end of ):   [x] Flu Vaccine: patient reported.   [] Breastfeeding:  [] Postpartum Birth control method:   [x] GBS at 36 - 37 wks:  [x] 39 weeks discussion of IOL vs. Expectant management: desires IOL at 39 weeks.   [x] Mode of delivery ( anticipated ): Vaginal            Hx of maternal laceration, 3rd degree, currently pregnant ([facility]) 2024 by ABRAM Freeman-SAULO  2025 by REINALDO Justice, ABRAM-CNP    Priority:  Medium       Overview Signed  2024  6:24 PM by REINALDO Freeman     Patient is interested in potential induction at 39 wks.                  Concerns: none- patient reports feeling well overall and had a much better experience at delivery then last delivery     Pain: controlled  Lacerations: 2nddefgree  Lochia: batterd  Sexual Intimacy: No  Contraceptive Method: CHC  Feeding Method: She is bottle feeding. no breast or nursing problems  Lactation Consult Needed?: No    Birth Trauma: No  Bonding with Baby: well with baby boy, Oscar    Mood:   Postpartum Depression: Low Risk  (3/31/2025)    [place]ma  Depression Scale     Last EPDS Total Score: 0     Last EPDS Self Harm Result: Never     Last pap: 2024    Objective    /64   Wt 73.5 kg (162 lb)   LMP 2024   Breastfeeding No   BMI 27.81 kg/m²      General: NAD, mood appropriate  Cardiopulmonary: warm and well perfused, breathing comfortably on room air  Abdomen:  non-tender  Extremities: Symmetric   Pelvic Exam see below         Physical Exam  Genitourinary:      Genitourinary Comments: 2nd degree laceration still in state of healing. Well approximated. Small area of erythema. Patient offered silver nitrate declines at this time.                 Plan    Advised to call office for breast complaints, abnormal bleeding, mood changes, or other concerning symptoms.   Cleared to resume normal activity as desired      Diagnoses and all orders for this visit:  Routine postpartum follow-up  Encounter for BCP (birth control pills) initial prescription  -     norethindrone-e.estradioL-iron (Blisovi 24 Fe) 1 mg-20 mcg (24)/75 mg (4) tablet; Take 1 tablet by mouth once daily.      REINALDO Freeman

## 2025-04-02 ENCOUNTER — APPOINTMENT (OUTPATIENT)
Dept: OBSTETRICS AND GYNECOLOGY | Facility: CLINIC | Age: 30
End: 2025-04-02
Payer: COMMERCIAL

## 2025-04-18 ENCOUNTER — TREATMENT (OUTPATIENT)
Dept: PHYSICAL THERAPY | Facility: CLINIC | Age: 30
End: 2025-04-18
Payer: COMMERCIAL

## 2025-04-18 PROCEDURE — 97140 MANUAL THERAPY 1/> REGIONS: CPT | Mod: GP | Performed by: PHYSICAL THERAPIST

## 2025-04-18 NOTE — PROGRESS NOTES
Physical Therapy    PELVIC FLOOR TREATMENT    Name: Ana Cruz  MRN: [id number]  : 1995  Today's Date: 25     Time Calculation  Start Time: 1300  Stop Time: 1330  Time Calculation (min): 30 min       PT Therapeutic Procedures Time Entry  Manual Therapy Time Entry:      Visit: 2  Insurance: [facility]  Auth: No     Assessment:   Internal examination performed today with informed pt consent demonstrated reduced pelvic floor strength and endurance  Mild erythema R introitus and potential granulation tissue, pt does follow up with OBGYN again next week         Plan:   Address any change in symptoms  Core progression with all strengthening work  Cat cow/ child's pose       Current Problem:  1. Postpartum care and examination ([facility])  Follow Up In Physical Therapy          Subjective   Pt reports overall feeling good  No symptoms at this time; no pain          Objective     SPECIAL TESTING:  DR (+)  Single finger width just proximal to umbilicus and distal to umbilicus   Good TA contraction, activation   Improved contraction with exhalation     EXTERNAL OBSERVATION:  Voluntary Contraction: Present   Voluntary Relaxation: Present  Involuntary Contraction: Present  Involuntary Relaxation Present         INTERNAL VAGINAL EXAMINATION:  PFM Strength: 2/5  Coordination: 4 in 10 seconds   Endurance: 1 second holds x 2 repetition; significant difficulty with endurance hold time     INTERNAL PALPATION:   No pain and tightness with internal examination    Mild erythema R introitus and possible granulation tissue  No pain with palpation at introitus or tear scar       EXTERNAL PALPATION:  Levator Ani: Mild Pain/Tightness R, Mild Pain/Tightness L  Bulbo: no Pain/Tightness R, no Pain/Tightness L  Ischiocavernosus: no Pain/Tightness R, no Pain/Tightness L  Transverse perineum: no Pain/Tightness R, no Pain/Tightness L      TREATMENT  Manual therapy:  Internal examination with informed pt consent     Therapeutic  exercise:  Added: Kegel strengthening x 10, 2x daily   DB  TA with march  Bridge with breath   PPT with breath        Careplan Goals:  Problem: PT Problem       Goal: Pt will demonstrate appropriate coordination of diaphragm, core, pelvic floor for improved pressure management           Goal: Pt will continue to report no urinary leakage, difficulty with BM for full return to regular activity with appropriate bladder control           Goal: Pt will report no perineal soreness for ability to resume regular sexual activity without pain          Goal: Pt will be independent in HEP for home maintenance              Sedrick Morrison, PT

## 2025-04-23 ENCOUNTER — APPOINTMENT (OUTPATIENT)
Dept: OBSTETRICS AND GYNECOLOGY | Facility: CLINIC | Age: 30
End: 2025-04-23
Payer: COMMERCIAL

## 2025-04-23 VITALS
SYSTOLIC BLOOD PRESSURE: 118 MMHG | BODY MASS INDEX: 27.39 KG/M2 | HEIGHT: 64 IN | DIASTOLIC BLOOD PRESSURE: 64 MMHG | WEIGHT: 160.4 LBS

## 2025-04-23 PROCEDURE — 99213 OFFICE O/P EST LOW 20 MIN: CPT

## 2025-04-23 ASSESSMENT — PAIN SCALES - GENERAL: PAINLEVEL_OUTOF10: 0-NO PAIN

## 2025-04-23 NOTE — PROGRESS NOTES
"Subjective   Ana Cruz is a 30 y.o. female who presents to office to check on the healing process of her perineal laceration. At her 6 week appointment patient had a notable exam of her  2nd degree laceration was still in state of healing. It was Well approximated with a small area of erythema. Patient offered silver nitrate at the time and declined. Patient reports that one week after that appointment she noticed notable improvement in her discomfort.     Objective   /64   Ht 1.626 m (5' 4\")   Wt 72.8 kg (160 lb 6.4 oz)   LMP 04/09/2024 (Approximate)      General:   Alert and oriented x 3   Vulva: EGBUS normal- 2nd degree laceration well healed. Small <1mm area of erythema in continued stage of healing- non-tender on palpation.    Vagina: Pink, normal discharge     Assessment/Plan   Diagnoses and all orders for this visit:  Perineal laceration during delivery, postpartum condition ([facility])    Discussed results with patient. Offered silver nitrate, patient declines.   Patient has follow up scheduled for annual exam.   All patient questions answered.     REINALDO Freeman   "

## 2025-04-28 ENCOUNTER — DOCUMENTATION (OUTPATIENT)
Dept: OBSTETRICS AND GYNECOLOGY | Facility: CLINIC | Age: 30
End: 2025-04-28
Payer: COMMERCIAL

## 2025-04-29 ENCOUNTER — TREATMENT (OUTPATIENT)
Dept: PHYSICAL THERAPY | Facility: CLINIC | Age: 30
End: 2025-04-29
Payer: COMMERCIAL

## 2025-04-29 PROCEDURE — 97110 THERAPEUTIC EXERCISES: CPT | Mod: GP | Performed by: PHYSICAL THERAPIST

## 2025-04-29 NOTE — PROGRESS NOTES
Physical Therapy    PELVIC FLOOR TREATMENT    Name: Ana Cruz  MRN: [id number]  : 1995  Today's Date: 25     Time Calculation  Start Time: 1300  Stop Time: 1330  Time Calculation (min): 30 min       PT Therapeutic Procedures Time Entry  Therapeutic Exercise Time Entry: 25     Visit: 3  Insurance: [employer]  Auth: No     Assessment:   Will monitor HEP; progressed all core work today  Will email to follow up as needed         Plan:   Pt will email to follow up as necessary         Current Problem:  1. Postpartum care and examination ([facility])  Follow Up In Physical Therapy          Subjective   Pt reports still feeling good  Followed up again with OBGYN; did not have to use silver nitrate  Was able to resume IC and pt did not have pain    Continues to deny pain symptoms or any urinary leakage          Objective     SPECIAL TESTING:  DR (+)  Single finger width just proximal to umbilicus and distal to umbilicus   Good TA contraction, activation   Improved contraction with exhalation       EXTERNAL OBSERVATION:  Voluntary Contraction: Present   Voluntary Relaxation: Present  Involuntary Contraction: Present  Involuntary Relaxation Present         INTERNAL VAGINAL EXAMINATION:  PFM Strength: 2/5  Coordination: 4 in 10 seconds   Endurance: 1 second holds x 2 repetition; significant difficulty with endurance hold time     INTERNAL PALPATION:   No pain and tightness with internal examination    Mild erythema R introitus and possible granulation tissue  No pain with palpation at introitus or tear scar       EXTERNAL PALPATION:  Levator Ani: Mild Pain/Tightness R, Mild Pain/Tightness L  Bulbo: no Pain/Tightness R, no Pain/Tightness L  Ischiocavernosus: no Pain/Tightness R, no Pain/Tightness L  Transverse perineum: no Pain/Tightness R, no Pain/Tightness L      TREATMENT  Therapeutic exercise:  PPT x 10   TA with march x 10   TA with SLR x 10 ea  90/90 hold x 10 sec   90/90 Tap downs x 10   Bridges x 10   Bridge  with march x 10   Quadruped TA x 10 with 3 hold  Quadruped kick outs, discussed progression to [facility] Goals:  Problem: PT Problem       Goal: Pt will demonstrate appropriate coordination of diaphragm, core, pelvic floor for improved pressure management           Goal: Pt will continue to report no urinary leakage, difficulty with BM for full return to regular activity with appropriate bladder control           Goal: Pt will report no perineal soreness for ability to resume regular sexual activity without pain          Goal: Pt will be independent in HEP for home maintenance              Sedrick Morrison, PT

## 2025-04-30 ENCOUNTER — APPOINTMENT (OUTPATIENT)
Dept: OBSTETRICS AND GYNECOLOGY | Facility: CLINIC | Age: 30
End: 2025-04-30
Payer: COMMERCIAL

## 2025-04-30 ENCOUNTER — TELEPHONE (OUTPATIENT)
Dept: OBSTETRICS AND GYNECOLOGY | Facility: CLINIC | Age: 30
End: 2025-04-30

## 2025-04-30 NOTE — TELEPHONE ENCOUNTER
Pt verified by name and .  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  [email or web address]

## 2025-06-30 ENCOUNTER — APPOINTMENT (OUTPATIENT)
Dept: OBSTETRICS AND GYNECOLOGY | Facility: CLINIC | Age: 30
End: 2025-06-30
Payer: COMMERCIAL

## 2025-08-06 ENCOUNTER — APPOINTMENT (OUTPATIENT)
Dept: OBSTETRICS AND GYNECOLOGY | Facility: CLINIC | Age: 30
End: 2025-08-06
Payer: COMMERCIAL

## 2025-08-06 VITALS
SYSTOLIC BLOOD PRESSURE: 108 MMHG | WEIGHT: 157 LBS | HEIGHT: 64 IN | BODY MASS INDEX: 26.8 KG/M2 | DIASTOLIC BLOOD PRESSURE: 70 MMHG

## 2025-08-06 DIAGNOSIS — Z01.419 WELL WOMAN EXAM WITH ROUTINE GYNECOLOGICAL EXAM: Primary | ICD-10-CM

## 2025-08-06 PROCEDURE — 3008F BODY MASS INDEX DOCD: CPT

## 2025-08-06 PROCEDURE — 99395 PREV VISIT EST AGE 18-39: CPT

## 2025-08-06 ASSESSMENT — ENCOUNTER SYMPTOMS
ALLERGIC/IMMUNOLOGIC NEGATIVE: 0
EYES NEGATIVE: 0
PSYCHIATRIC NEGATIVE: 0
ENDOCRINE NEGATIVE: 0
GASTROINTESTINAL NEGATIVE: 0
HEMATOLOGIC/LYMPHATIC NEGATIVE: 0
NEUROLOGICAL NEGATIVE: 0
RESPIRATORY NEGATIVE: 0
CONSTITUTIONAL NEGATIVE: 0
MUSCULOSKELETAL NEGATIVE: 0
CARDIOVASCULAR NEGATIVE: 0

## 2025-08-06 ASSESSMENT — PAIN SCALES - GENERAL: PAINLEVEL_OUTOF10: 0-NO PAIN

## 2025-08-06 NOTE — PROGRESS NOTES
"Subjective   Ana Cruz is a 30 y.o. female who is here for Annual Exam (Last PAP= 2024 NEGATIVE//Aditi. VAMSHI GIBBS II/).     Concerns today:  None    Periods are still \"galo irregular\" postpartum.   Patient on OCPs    Sexual Activity: sexually active, male partners; Patient reports 1 partners in the last 12 months.  Pain with intercourse? Yes     History of prior STI: none  Desires STI screening? No    Current contraception: OCP (estrogen/progesterone)    Last pap: 24-wnl  Last mammogram: NA    Family history of uterine or ovarian cancer: no  Family history of breast cancer: no      OB History    Para Term  AB Living   2 2 2 0 0 2   SAB IAB Ectopic Multiple Live Births   0 0 0 0 2      Objective   /70   Ht 1.626 m (5' 4\")   Wt 71.2 kg (157 lb)   LMP 2025 (Approximate)      General:   Alert and oriented x 3   Heart:  Lungs: Regular rate, rhythm  Clear to auscultation bilaterally   Thyroid: Euthyroid, normal shape and size   Breast: Symmetrical, no skin changes/nipple discharge, redness, tenderness, no masses palpated bilaterally   Abdomen: Soft, non tender   Vulva: EGBUS normal   Vagina: Pink, normal discharge   Cervix: No CMT   Uterus: Normal shape, size   Adnexa: NT bilaterally         Assessment/Plan   Diagnoses and all orders for this visit:  Well woman exam with routine gynecological exam      All patient questions answered.   Encouraged to reach out to our office with any questions or concerns.   Encouraged patient to follow up annually and PRN     REINALDO Freeman   "